# Patient Record
Sex: FEMALE | Race: WHITE | Employment: OTHER | URBAN - METROPOLITAN AREA
[De-identification: names, ages, dates, MRNs, and addresses within clinical notes are randomized per-mention and may not be internally consistent; named-entity substitution may affect disease eponyms.]

---

## 2017-02-02 PROCEDURE — 86038 ANTINUCLEAR ANTIBODIES: CPT | Performed by: OTHER

## 2017-02-08 PROCEDURE — 81001 URINALYSIS AUTO W/SCOPE: CPT | Performed by: INTERNAL MEDICINE

## 2017-02-08 PROCEDURE — 87086 URINE CULTURE/COLONY COUNT: CPT | Performed by: INTERNAL MEDICINE

## 2017-03-01 PROBLEM — Z96.1 PSEUDOPHAKIA: Status: ACTIVE | Noted: 2017-03-01

## 2017-03-01 PROBLEM — H44.23 MYOPIC DEGENERATION, BILATERAL: Status: ACTIVE | Noted: 2017-03-01

## 2017-03-01 PROBLEM — H35.341 MACULAR HOLE OF RIGHT EYE: Status: ACTIVE | Noted: 2017-03-01

## 2017-03-01 PROBLEM — H35.372 EPIRETINAL MEMBRANE (ERM) OF LEFT EYE: Status: ACTIVE | Noted: 2017-03-01

## 2017-04-18 PROCEDURE — 86200 CCP ANTIBODY: CPT | Performed by: INTERNAL MEDICINE

## 2017-04-26 PROBLEM — R29.898 WEAKNESS OF LEFT HAND: Status: ACTIVE | Noted: 2017-04-26

## 2017-04-26 PROBLEM — R29.898 WEAKNESS OF SHOULDER: Status: ACTIVE | Noted: 2017-04-26

## 2017-04-26 PROBLEM — M79.89 SWELLING OF BOTH HANDS: Status: ACTIVE | Noted: 2017-04-26

## 2017-04-26 PROBLEM — M21.942: Status: ACTIVE | Noted: 2017-04-26

## 2017-07-18 PROBLEM — M15.4 EROSIVE OSTEOARTHRITIS OF BOTH HANDS: Status: ACTIVE | Noted: 2017-07-18

## 2017-11-21 PROBLEM — M06.4 INFLAMMATORY POLYARTHRITIS (HCC): Status: ACTIVE | Noted: 2017-11-21

## 2018-01-03 PROBLEM — I10 ESSENTIAL HYPERTENSION: Status: ACTIVE | Noted: 2018-01-03

## 2019-02-11 PROBLEM — I25.10 CORONARY ARTERY CALCIFICATION SEEN ON CAT SCAN: Status: ACTIVE | Noted: 2019-02-11

## 2019-02-11 PROBLEM — I70.0 THORACIC AORTIC ATHEROSCLEROSIS (HCC): Status: ACTIVE | Noted: 2019-02-11

## 2019-02-11 PROBLEM — I65.23 BILATERAL CAROTID ARTERY STENOSIS: Status: ACTIVE | Noted: 2019-02-11

## 2019-02-11 PROBLEM — G63 NEUROPATHY ASSOCIATED WITH LYMPHOMA (HCC): Status: ACTIVE | Noted: 2019-02-11

## 2019-02-11 PROBLEM — I77.810 AORTIC ECTASIA, THORACIC (HCC): Status: ACTIVE | Noted: 2019-02-11

## 2019-02-11 PROBLEM — I77.9 ARTERIAL DISEASE (HCC): Status: ACTIVE | Noted: 2019-02-11

## 2019-02-11 PROBLEM — N18.30 CKD (CHRONIC KIDNEY DISEASE) STAGE 3, GFR 30-59 ML/MIN (HCC): Status: ACTIVE | Noted: 2019-02-11

## 2019-02-11 PROBLEM — C85.90 NEUROPATHY ASSOCIATED WITH LYMPHOMA (HCC): Status: ACTIVE | Noted: 2019-02-11

## 2019-02-12 PROBLEM — G82.50 SPASTIC QUADRIPARESIS (HCC): Status: ACTIVE | Noted: 2019-02-12

## 2020-11-22 PROBLEM — Z79.899 HIGH RISK MEDICATION USE: Status: ACTIVE | Noted: 2020-11-22

## 2021-02-14 ENCOUNTER — HOSPITAL ENCOUNTER (INPATIENT)
Facility: HOSPITAL | Age: 76
LOS: 13 days | Discharge: NURSING, REHAB OR LONG-TERM CARE | DRG: 840 | End: 2021-02-27
Attending: STUDENT IN AN ORGANIZED HEALTH CARE EDUCATION/TRAINING PROGRAM | Admitting: INTERNAL MEDICINE
Payer: MEDICARE

## 2021-02-14 ENCOUNTER — APPOINTMENT (OUTPATIENT)
Dept: GENERAL RADIOLOGY | Facility: HOSPITAL | Age: 76
DRG: 840 | End: 2021-02-14
Attending: STUDENT IN AN ORGANIZED HEALTH CARE EDUCATION/TRAINING PROGRAM
Payer: MEDICARE

## 2021-02-14 DIAGNOSIS — R41.0 CONFUSION: ICD-10-CM

## 2021-02-14 DIAGNOSIS — N17.9 ACUTE KIDNEY INJURY (HCC): ICD-10-CM

## 2021-02-14 DIAGNOSIS — R35.0 URINARY FREQUENCY: ICD-10-CM

## 2021-02-14 DIAGNOSIS — E83.52 HYPERCALCEMIA: Primary | ICD-10-CM

## 2021-02-14 LAB
ALBUMIN SERPL-MCNC: 2.7 G/DL (ref 3.4–5)
ALBUMIN/GLOB SERPL: 0.8 {RATIO} (ref 1–2)
ALP LIVER SERPL-CCNC: 60 U/L
ALT SERPL-CCNC: 22 U/L
ANION GAP SERPL CALC-SCNC: 7 MMOL/L (ref 0–18)
AST SERPL-CCNC: 27 U/L (ref 15–37)
BASOPHILS # BLD AUTO: 0.02 X10(3) UL (ref 0–0.2)
BASOPHILS NFR BLD AUTO: 0.3 %
BILIRUB SERPL-MCNC: 0.5 MG/DL (ref 0.1–2)
BILIRUB UR QL STRIP.AUTO: NEGATIVE
BUN BLD-MCNC: 33 MG/DL (ref 7–18)
BUN/CREAT SERPL: 16 (ref 10–20)
CALCIUM BLD-MCNC: 13.8 MG/DL (ref 8.5–10.1)
CHLORIDE SERPL-SCNC: 103 MMOL/L (ref 98–112)
CO2 SERPL-SCNC: 29 MMOL/L (ref 21–32)
COLOR UR AUTO: YELLOW
CREAT BLD-MCNC: 2.06 MG/DL
DEPRECATED RDW RBC AUTO: 54.7 FL (ref 35.1–46.3)
EOSINOPHIL # BLD AUTO: 0.07 X10(3) UL (ref 0–0.7)
EOSINOPHIL NFR BLD AUTO: 1.1 %
ERYTHROCYTE [DISTWIDTH] IN BLOOD BY AUTOMATED COUNT: 15.1 % (ref 11–15)
GLOBULIN PLAS-MCNC: 3.4 G/DL (ref 2.8–4.4)
GLUCOSE BLD-MCNC: 89 MG/DL (ref 70–99)
GLUCOSE UR STRIP.AUTO-MCNC: NEGATIVE MG/DL
HAV IGM SER QL: 1.5 MG/DL (ref 1.6–2.6)
HCT VFR BLD AUTO: 31.6 %
HGB BLD-MCNC: 10.4 G/DL
IMM GRANULOCYTES # BLD AUTO: 0.03 X10(3) UL (ref 0–1)
IMM GRANULOCYTES NFR BLD: 0.5 %
IONIZED CALCIUM: 1.94 MMOL/L (ref 1.12–1.32)
KETONES UR STRIP.AUTO-MCNC: NEGATIVE MG/DL
LEUKOCYTE ESTERASE UR QL STRIP.AUTO: NEGATIVE
LYMPHOCYTES # BLD AUTO: 1.14 X10(3) UL (ref 1–4)
LYMPHOCYTES NFR BLD AUTO: 18.5 %
M PROTEIN MFR SERPL ELPH: 6.1 G/DL (ref 6.4–8.2)
MCH RBC QN AUTO: 32.6 PG (ref 26–34)
MCHC RBC AUTO-ENTMCNC: 32.9 G/DL (ref 31–37)
MCV RBC AUTO: 99.1 FL
MONOCYTES # BLD AUTO: 0.75 X10(3) UL (ref 0.1–1)
MONOCYTES NFR BLD AUTO: 12.2 %
NEUTROPHILS # BLD AUTO: 4.15 X10 (3) UL (ref 1.5–7.7)
NEUTROPHILS # BLD AUTO: 4.15 X10(3) UL (ref 1.5–7.7)
NEUTROPHILS NFR BLD AUTO: 67.4 %
NITRITE UR QL STRIP.AUTO: NEGATIVE
OSMOLALITY SERPL CALC.SUM OF ELEC: 295 MOSM/KG (ref 275–295)
PH UR STRIP.AUTO: 5 [PH] (ref 4.5–8)
PHOSPHATE SERPL-MCNC: 3.3 MG/DL (ref 2.5–4.9)
PLATELET # BLD AUTO: 94 10(3)UL (ref 150–450)
POTASSIUM SERPL-SCNC: 3.7 MMOL/L (ref 3.5–5.1)
PROT UR STRIP.AUTO-MCNC: NEGATIVE MG/DL
PTH-INTACT SERPL-MCNC: 16.5 PG/ML (ref 18.5–88)
RBC # BLD AUTO: 3.19 X10(6)UL
RBC UR QL AUTO: NEGATIVE
SARS-COV-2 RNA RESP QL NAA+PROBE: NOT DETECTED
SODIUM SERPL-SCNC: 139 MMOL/L (ref 136–145)
SP GR UR STRIP.AUTO: 1.02 (ref 1–1.03)
TROPONIN I SERPL-MCNC: <0.045 NG/ML (ref ?–0.04)
TSI SER-ACNC: 0.43 MIU/ML (ref 0.36–3.74)
UROBILINOGEN UR STRIP.AUTO-MCNC: <2 MG/DL
VIT D+METAB SERPL-MCNC: 57.5 NG/ML (ref 30–100)
WBC # BLD AUTO: 6.2 X10(3) UL (ref 4–11)

## 2021-02-14 PROCEDURE — 86334 IMMUNOFIX E-PHORESIS SERUM: CPT | Performed by: INTERNAL MEDICINE

## 2021-02-14 PROCEDURE — 96361 HYDRATE IV INFUSION ADD-ON: CPT

## 2021-02-14 PROCEDURE — 83883 ASSAY NEPHELOMETRY NOT SPEC: CPT | Performed by: INTERNAL MEDICINE

## 2021-02-14 PROCEDURE — 93005 ELECTROCARDIOGRAM TRACING: CPT

## 2021-02-14 PROCEDURE — 81003 URINALYSIS AUTO W/O SCOPE: CPT | Performed by: STUDENT IN AN ORGANIZED HEALTH CARE EDUCATION/TRAINING PROGRAM

## 2021-02-14 PROCEDURE — 84100 ASSAY OF PHOSPHORUS: CPT | Performed by: STUDENT IN AN ORGANIZED HEALTH CARE EDUCATION/TRAINING PROGRAM

## 2021-02-14 PROCEDURE — 84443 ASSAY THYROID STIM HORMONE: CPT | Performed by: INTERNAL MEDICINE

## 2021-02-14 PROCEDURE — 93010 ELECTROCARDIOGRAM REPORT: CPT

## 2021-02-14 PROCEDURE — 84165 PROTEIN E-PHORESIS SERUM: CPT | Performed by: INTERNAL MEDICINE

## 2021-02-14 PROCEDURE — 96360 HYDRATION IV INFUSION INIT: CPT

## 2021-02-14 PROCEDURE — 99285 EMERGENCY DEPT VISIT HI MDM: CPT

## 2021-02-14 PROCEDURE — 80053 COMPREHEN METABOLIC PANEL: CPT | Performed by: STUDENT IN AN ORGANIZED HEALTH CARE EDUCATION/TRAINING PROGRAM

## 2021-02-14 PROCEDURE — 85025 COMPLETE CBC W/AUTO DIFF WBC: CPT | Performed by: STUDENT IN AN ORGANIZED HEALTH CARE EDUCATION/TRAINING PROGRAM

## 2021-02-14 PROCEDURE — 82330 ASSAY OF CALCIUM: CPT | Performed by: STUDENT IN AN ORGANIZED HEALTH CARE EDUCATION/TRAINING PROGRAM

## 2021-02-14 PROCEDURE — 82306 VITAMIN D 25 HYDROXY: CPT | Performed by: INTERNAL MEDICINE

## 2021-02-14 PROCEDURE — 83970 ASSAY OF PARATHORMONE: CPT | Performed by: INTERNAL MEDICINE

## 2021-02-14 PROCEDURE — 83735 ASSAY OF MAGNESIUM: CPT | Performed by: STUDENT IN AN ORGANIZED HEALTH CARE EDUCATION/TRAINING PROGRAM

## 2021-02-14 PROCEDURE — 82652 VIT D 1 25-DIHYDROXY: CPT | Performed by: INTERNAL MEDICINE

## 2021-02-14 PROCEDURE — 84484 ASSAY OF TROPONIN QUANT: CPT | Performed by: STUDENT IN AN ORGANIZED HEALTH CARE EDUCATION/TRAINING PROGRAM

## 2021-02-14 PROCEDURE — 71045 X-RAY EXAM CHEST 1 VIEW: CPT | Performed by: STUDENT IN AN ORGANIZED HEALTH CARE EDUCATION/TRAINING PROGRAM

## 2021-02-14 RX ORDER — ASPIRIN 81 MG/1
81 TABLET ORAL NIGHTLY
COMMUNITY

## 2021-02-14 RX ORDER — ALLOPURINOL 100 MG/1
200 TABLET ORAL DAILY
Status: DISCONTINUED | OUTPATIENT
Start: 2021-02-15 | End: 2021-02-27

## 2021-02-14 RX ORDER — MELATONIN
250 DAILY
Status: DISCONTINUED | OUTPATIENT
Start: 2021-02-15 | End: 2021-02-27

## 2021-02-14 RX ORDER — ACETAMINOPHEN 325 MG/1
650 TABLET ORAL EVERY 6 HOURS PRN
Status: DISCONTINUED | OUTPATIENT
Start: 2021-02-14 | End: 2021-02-27

## 2021-02-14 RX ORDER — GABAPENTIN 400 MG/1
1200 CAPSULE ORAL EVERY 8 HOURS
Status: ON HOLD | COMMUNITY
End: 2021-02-25

## 2021-02-14 RX ORDER — ONDANSETRON 2 MG/ML
4 INJECTION INTRAMUSCULAR; INTRAVENOUS EVERY 6 HOURS PRN
Status: DISCONTINUED | OUTPATIENT
Start: 2021-02-14 | End: 2021-02-27

## 2021-02-14 RX ORDER — SODIUM CHLORIDE 9 MG/ML
INJECTION, SOLUTION INTRAVENOUS CONTINUOUS
Status: ACTIVE | OUTPATIENT
Start: 2021-02-14 | End: 2021-02-14

## 2021-02-14 RX ORDER — ASPIRIN 81 MG/1
81 TABLET ORAL NIGHTLY
Status: DISCONTINUED | OUTPATIENT
Start: 2021-02-14 | End: 2021-02-27

## 2021-02-14 RX ORDER — HYDROXYCHLOROQUINE SULFATE 200 MG/1
200 TABLET, FILM COATED ORAL 2 TIMES DAILY
Status: DISCONTINUED | OUTPATIENT
Start: 2021-02-14 | End: 2021-02-27

## 2021-02-14 RX ORDER — CALCITONIN SALMON 200 [USP'U]/ML
4 INJECTION, SOLUTION INTRAMUSCULAR; SUBCUTANEOUS 2 TIMES DAILY
Status: COMPLETED | OUTPATIENT
Start: 2021-02-14 | End: 2021-02-16

## 2021-02-14 RX ORDER — HYDROXYCHLOROQUINE SULFATE 200 MG/1
200 TABLET, FILM COATED ORAL 2 TIMES DAILY
COMMUNITY

## 2021-02-14 RX ORDER — SODIUM CHLORIDE 9 MG/ML
200 INJECTION, SOLUTION INTRAVENOUS CONTINUOUS
Status: DISCONTINUED | OUTPATIENT
Start: 2021-02-14 | End: 2021-02-16

## 2021-02-14 RX ORDER — METOPROLOL SUCCINATE 50 MG/1
50 TABLET, EXTENDED RELEASE ORAL
Status: DISCONTINUED | OUTPATIENT
Start: 2021-02-15 | End: 2021-02-27

## 2021-02-14 RX ORDER — HEPARIN SODIUM 5000 [USP'U]/ML
5000 INJECTION, SOLUTION INTRAVENOUS; SUBCUTANEOUS EVERY 8 HOURS SCHEDULED
Status: DISCONTINUED | OUTPATIENT
Start: 2021-02-14 | End: 2021-02-27

## 2021-02-14 RX ORDER — MELATONIN
1000 DAILY
Status: DISCONTINUED | OUTPATIENT
Start: 2021-02-15 | End: 2021-02-27

## 2021-02-14 RX ORDER — METOCLOPRAMIDE HYDROCHLORIDE 5 MG/ML
5 INJECTION INTRAMUSCULAR; INTRAVENOUS EVERY 8 HOURS PRN
Status: DISCONTINUED | OUTPATIENT
Start: 2021-02-14 | End: 2021-02-27

## 2021-02-14 RX ORDER — SODIUM CHLORIDE 9 MG/ML
INJECTION, SOLUTION INTRAVENOUS CONTINUOUS
Status: DISCONTINUED | OUTPATIENT
Start: 2021-02-14 | End: 2021-02-14

## 2021-02-14 RX ORDER — MELATONIN
100 DAILY
Status: DISCONTINUED | OUTPATIENT
Start: 2021-02-15 | End: 2021-02-27

## 2021-02-14 RX ORDER — GABAPENTIN 300 MG/1
300 CAPSULE ORAL EVERY 8 HOURS
Status: DISCONTINUED | OUTPATIENT
Start: 2021-02-14 | End: 2021-02-16

## 2021-02-14 RX ORDER — MELATONIN
800 DAILY
Status: DISCONTINUED | OUTPATIENT
Start: 2021-02-15 | End: 2021-02-27

## 2021-02-14 RX ORDER — MELATONIN
325 DAILY
Status: DISCONTINUED | OUTPATIENT
Start: 2021-02-15 | End: 2021-02-27

## 2021-02-14 NOTE — ED INITIAL ASSESSMENT (HPI)
Pt here today for increased fatigue over the past few weeks with decreased PO intake.      Pt's son also reports possible UTI due to decreased coherency

## 2021-02-15 LAB
ALBUMIN SERPL-MCNC: 2.2 G/DL (ref 3.4–5)
ALBUMIN SERPL-MCNC: 2.4 G/DL (ref 3.4–5)
ANION GAP SERPL CALC-SCNC: 4 MMOL/L (ref 0–18)
ANION GAP SERPL CALC-SCNC: 6 MMOL/L (ref 0–18)
ATRIAL RATE: 74 BPM
ATRIAL RATE: 77 BPM
BASOPHILS # BLD AUTO: 0.01 X10(3) UL (ref 0–0.2)
BASOPHILS NFR BLD AUTO: 0.2 %
BUN BLD-MCNC: 26 MG/DL (ref 7–18)
BUN BLD-MCNC: 26 MG/DL (ref 7–18)
BUN/CREAT SERPL: 17.6 (ref 10–20)
BUN/CREAT SERPL: 17.8 (ref 10–20)
CALCIUM BLD-MCNC: 11.3 MG/DL (ref 8.5–10.1)
CALCIUM BLD-MCNC: 11.4 MG/DL (ref 8.5–10.1)
CHLORIDE SERPL-SCNC: 109 MMOL/L (ref 98–112)
CHLORIDE SERPL-SCNC: 109 MMOL/L (ref 98–112)
CO2 SERPL-SCNC: 24 MMOL/L (ref 21–32)
CO2 SERPL-SCNC: 27 MMOL/L (ref 21–32)
CREAT BLD-MCNC: 1.46 MG/DL
CREAT BLD-MCNC: 1.48 MG/DL
DEPRECATED RDW RBC AUTO: 55.1 FL (ref 35.1–46.3)
EOSINOPHIL # BLD AUTO: 0.08 X10(3) UL (ref 0–0.7)
EOSINOPHIL NFR BLD AUTO: 1.7 %
ERYTHROCYTE [DISTWIDTH] IN BLOOD BY AUTOMATED COUNT: 15.2 % (ref 11–15)
GLUCOSE BLD-MCNC: 106 MG/DL (ref 70–99)
GLUCOSE BLD-MCNC: 115 MG/DL (ref 70–99)
HAV IGM SER QL: 1.3 MG/DL (ref 1.6–2.6)
HAV IGM SER QL: 1.3 MG/DL (ref 1.6–2.6)
HCT VFR BLD AUTO: 27.3 %
HGB BLD-MCNC: 8.6 G/DL
IMM GRANULOCYTES # BLD AUTO: 0.03 X10(3) UL (ref 0–1)
IMM GRANULOCYTES NFR BLD: 0.6 %
IONIZED CALCIUM: 1.72 MMOL/L (ref 1.12–1.32)
LDH SERPL L TO P-CCNC: 234 U/L
LYMPHOCYTES # BLD AUTO: 0.87 X10(3) UL (ref 1–4)
LYMPHOCYTES NFR BLD AUTO: 18.8 %
MCH RBC QN AUTO: 31.6 PG (ref 26–34)
MCHC RBC AUTO-ENTMCNC: 31.5 G/DL (ref 31–37)
MCV RBC AUTO: 100.4 FL
MONOCYTES # BLD AUTO: 0.44 X10(3) UL (ref 0.1–1)
MONOCYTES NFR BLD AUTO: 9.5 %
NEUTROPHILS # BLD AUTO: 3.19 X10 (3) UL (ref 1.5–7.7)
NEUTROPHILS # BLD AUTO: 3.19 X10(3) UL (ref 1.5–7.7)
NEUTROPHILS NFR BLD AUTO: 69.2 %
OSMOLALITY SERPL CALC.SUM OF ELEC: 294 MOSM/KG (ref 275–295)
OSMOLALITY SERPL CALC.SUM OF ELEC: 295 MOSM/KG (ref 275–295)
P AXIS: 63 DEGREES
P AXIS: 70 DEGREES
P-R INTERVAL: 202 MS
P-R INTERVAL: 210 MS
PHOSPHATE SERPL-MCNC: 2.7 MG/DL (ref 2.5–4.9)
PHOSPHATE SERPL-MCNC: 3 MG/DL (ref 2.5–4.9)
PLATELET # BLD AUTO: 79 10(3)UL (ref 150–450)
POTASSIUM SERPL-SCNC: 3.6 MMOL/L (ref 3.5–5.1)
POTASSIUM SERPL-SCNC: 4 MMOL/L (ref 3.5–5.1)
POTASSIUM SERPL-SCNC: 4 MMOL/L (ref 3.5–5.1)
Q-T INTERVAL: 356 MS
Q-T INTERVAL: 404 MS
QRS DURATION: 90 MS
QRS DURATION: 94 MS
QTC CALCULATION (BEZET): 448 MS
QTC CALCULATION (BEZET): 461 MS
R AXIS: -29 DEGREES
R AXIS: -29 DEGREES
RBC # BLD AUTO: 2.72 X10(6)UL
SODIUM SERPL-SCNC: 139 MMOL/L (ref 136–145)
SODIUM SERPL-SCNC: 140 MMOL/L (ref 136–145)
T AXIS: 26 DEGREES
T AXIS: 33 DEGREES
URATE SERPL-MCNC: 7.3 MG/DL
VENTRICULAR RATE: 101 BPM
VENTRICULAR RATE: 74 BPM
WBC # BLD AUTO: 4.6 X10(3) UL (ref 4–11)

## 2021-02-15 PROCEDURE — 80069 RENAL FUNCTION PANEL: CPT | Performed by: INTERNAL MEDICINE

## 2021-02-15 PROCEDURE — 83735 ASSAY OF MAGNESIUM: CPT | Performed by: INTERNAL MEDICINE

## 2021-02-15 PROCEDURE — 84132 ASSAY OF SERUM POTASSIUM: CPT | Performed by: HOSPITALIST

## 2021-02-15 PROCEDURE — 82330 ASSAY OF CALCIUM: CPT | Performed by: INTERNAL MEDICINE

## 2021-02-15 PROCEDURE — 83615 LACTATE (LD) (LDH) ENZYME: CPT | Performed by: INTERNAL MEDICINE

## 2021-02-15 PROCEDURE — 82542 COL CHROMOTOGRAPHY QUAL/QUAN: CPT | Performed by: INTERNAL MEDICINE

## 2021-02-15 PROCEDURE — 85025 COMPLETE CBC W/AUTO DIFF WBC: CPT | Performed by: INTERNAL MEDICINE

## 2021-02-15 PROCEDURE — 84550 ASSAY OF BLOOD/URIC ACID: CPT | Performed by: INTERNAL MEDICINE

## 2021-02-15 RX ORDER — MAGNESIUM OXIDE 400 MG (241.3 MG MAGNESIUM) TABLET
800 TABLET ONCE
Status: COMPLETED | OUTPATIENT
Start: 2021-02-15 | End: 2021-02-15

## 2021-02-15 RX ORDER — MAGNESIUM SULFATE 1 G/100ML
1 INJECTION INTRAVENOUS ONCE
Status: COMPLETED | OUTPATIENT
Start: 2021-02-15 | End: 2021-02-15

## 2021-02-15 RX ORDER — POTASSIUM CHLORIDE 20 MEQ/1
40 TABLET, EXTENDED RELEASE ORAL ONCE
Status: COMPLETED | OUTPATIENT
Start: 2021-02-15 | End: 2021-02-15

## 2021-02-15 NOTE — PLAN OF CARE
Pt AOx3, feeling more like herself today. IVF increased to 0.9/200. Nephrology on case, following renal function throughout day. Electrolytes replaced per hospitalist order. Up to chair. PT consult. No c/o pain.

## 2021-02-15 NOTE — PROGRESS NOTES
NURSING ADMISSION NOTE      Patient admitted via Cart  Oriented to room. Safety precautions initiated. Bed in low position. Call light in reach. Pt admitted from ED. VSS, afebrile. Denies pain or SOB.  Admission navigator completed with patient an

## 2021-02-15 NOTE — CONSULTS
[facility]    Report of Consultation    Juliane Tena Patient Status:  Inpatient    3/17/1945 MRN [id number]   [facility] 4NW-A Attending Grey Carey MD   Hosp Day # 1 PCP Louise Paredes MD     Date of consult: 2/15/2021 SURGICAL HISTORY  2-15-10    cystocele repair   • OTHER SURGICAL HISTORY      macular hole right   • RIGHT PHACOEMULSIFICATION OF CATARACT WITH INTRAOCULAR LENS IMPLANT [id number] Left 6/17/2009    Performed by Rhona Reilly MD at [address] [place] [address]   • ondansetron HCl (ZOFRAN) injection 4 mg, 4 mg, Intravenous, Q6H PRN  •  Metoclopramide HCl (REGLAN) injection 5 mg, 5 mg, Intravenous, Q8H PRN  •  Calcitonin (Allison) (MIACALCIN) injection 240 Units, 4 Units/kg, Subcutaneous, BID  No current outpatient med 02/15/2021    MCH 31.6 02/15/2021    MCHC 31.5 02/15/2021    RDW 15.2 (H) 02/15/2021    NEPRELIM 3.19 02/15/2021    NEUTABS 6.22 11/06/2013    LYMPHABS 4.83 (H) 11/06/2013    EOSABS 0.00 11/06/2013    BASABS 0.00 11/06/2013    NEUT 48 11/06/2013    LYMPH 3 CKD3b  -- baseline Cr 1.2-1.4mg/dL   -- Cr 2.06mg/dL on admit   -- likely prerenal from poor po intake + vasoconstriction from hypercalcemia   -- check urine lytes  -- continue IVFs  -- if not improving further check renal US  -- avoid nsaids and renally d

## 2021-02-15 NOTE — CONSULTS
[facility]  Report of Consultation    Carl Santo Patient Status:  Inpatient    3/17/1945 MRN [id number]   [facility] 4NW-A Attending Rocky Colon MD   Hosp Day # 1 PCP Sonny Coulter MD     REASON FOR CONSULTATION:     NHL his History:   Procedure Laterality Date   • BIOPSY OF NECK LYMPH NODE N/A 6/14/2013    Performed by Pablo Newsome MD at [address] [place] [address]   • BIOPSY OF NECK LYMPH NODE Left 5/29/2013    Performed by Pablo Newsome MD at [facility] gabapentin (NEURONTIN) cap 300 mg, 300 mg, Oral, Q8H  •  Hydroxychloroquine Sulfate (PLAQUENIL) tab 200 mg, 200 mg, Oral, BID  •  Metoprolol Succinate ER (Toprol XL) 24 hr tab 50 mg, 50 mg, Oral, Daily Beta Blocker  •  vitamin B-6 (pyridoxine) tab 100 mg, Value Ref Range    Hold Lavender Auto Resulted    RAINBOW DRAW LIGHT GREEN    Collection Time: 02/14/21  3:23 PM   Result Value Ref Range    Hold Lt Green Auto Resulted    RAINBOW DRAW GOLD    Collection Time: 02/14/21  3:23 PM   Result Value Ref Range - 1.00 x10(3) uL    Neutrophil % 67.4 %    Lymphocyte % 18.5 %    Monocyte % 12.2 %    Eosinophil % 1.1 %    Basophil % 0.3 %    Immature Granulocyte % 0.5 %   PHOSPHORUS    Collection Time: 02/14/21  3:23 PM   Result Value Ref Range    Phosphorus 3.3 2.5 SARS-CoV-2 by PCR Not Detected Not Detected   CALCIUM, IONIZED    Collection Time: 02/14/21  5:14 PM   Result Value Ref Range    Ionized Calcium 1.94 (H) 1.12 - 1.32 mmol/L   PTH, INTACT    Collection Time: 02/14/21  8:49 PM   Result Value Ref Range    Pth %   SCAN SLIDE    Collection Time: 02/15/21  7:10 AM   Result Value Ref Range    Slide Review       Slide reviewed, normal WBC, RBC, and platelet morphology.    LDH    Collection Time: 02/15/21  7:10 AM   Result Value Ref Range     84 - 246 U/L   URI Myopic degeneration, bilateral     Macular hole of right eye     Epiretinal membrane (ERM) of left eye     Pseudophakia     Erosive osteoarthritis of both hands     Inflammatory polyarthritis ([address]     Essential hypertension     Thoracic aortic atheroscler

## 2021-02-15 NOTE — H&P
[facility] Medicine H&P     Patient presents with:  Fatigue       PCP: Roselyn Koroma MD    History of Present Illness: Patient is a 76year old female with PMH sig for non-Hodgkins lymphoma, CKD 3, inflammatory polyarthritis, and gout, presented to ER last 2-15-10    cystocele repair   • OTHER SURGICAL HISTORY      macular hole right   • RIGHT PHACOEMULSIFICATION OF CATARACT WITH INTRAOCULAR LENS IMPLANT [id number] Left 6/17/2009    Performed by Kathryn Richmond MD at [facility] [place] [address] at 0900    •  Ferrous Sulfate (IRON) 28 MG Oral Tab, Take 65 mg by mouth daily. , Disp: , Rfl: , 2/14/2021 at Unknown time    •  Cyanocobalamin (VITAMIN B-12) 1000 MCG Oral Tab, Take 1,000 mcg by mouth daily. , Disp: , Rfl: , 2/14/2021 at Unknown time    • consistent with small pleural effusions, left greater than right. Heart and pulmonary vessels appear stable, normal caliber allowing for portable technique. Mediastinal contours are smooth.   There is partial visualization of a new left IJ approach chest

## 2021-02-15 NOTE — CM/SW NOTE
02/15/21 1600   CM/SW Referral Data   Referral Source Social Work (self-referral)   Reason for Referral Discharge planning   Informant Patient   Patient Info   Patient's [address]   Number of Levels in Home 2   Number of Stair in Home 8   P

## 2021-02-15 NOTE — ED PROVIDER NOTES
Patient Seen in: [facility] Emergency Department      History   Patient presents with:  Fatigue    Stated Complaint: Fatigue, possible UTI    HPI/Subjective:   HPI    Patient is a 77-year-old female who presents emergency department with fatigue, de PHACOEMULSIFICATION OF CATARACT WITH INTRAOCULAR LENS IMPLANT [id number] Left 6/17/2009    Performed by Justice Rubinstein, MD at [address] [place] [address]   • ULTRASOUND GUIDED FINE NEEDLE ASPIRATION OF  LYMPH NODE Left 5/29/2013    Performed by Stephanie Murillo, Reviewed   COMP METABOLIC PANEL (14) - Abnormal; Notable for the following components:       Result Value    BUN 33 (*)     Creatinine 2.06 (*)     Calcium, Total 13.8 (*)     GFR, Non- 23 (*)     GFR, -American 27 (*)     Total Prot noted on EKG Report. Rate: 74  Rhythm: Sinus Rhythm  Reading: Sinus rhythm with first-degree AV block and frequent PVCs, QRS duration 94 ms,  ms, nonspecific ST wave abnormalities noted.   Nonspecific change from prior EKG from September 2015 is pre

## 2021-02-16 ENCOUNTER — APPOINTMENT (OUTPATIENT)
Dept: GENERAL RADIOLOGY | Facility: HOSPITAL | Age: 76
DRG: 840 | End: 2021-02-16
Attending: HOSPITALIST
Payer: MEDICARE

## 2021-02-16 LAB
ALBUMIN SERPL-MCNC: 2 G/DL (ref 3.4–5)
ANION GAP SERPL CALC-SCNC: 5 MMOL/L (ref 0–18)
BASOPHILS # BLD AUTO: 0.03 X10(3) UL (ref 0–0.2)
BASOPHILS NFR BLD AUTO: 0.7 %
BUN BLD-MCNC: 21 MG/DL (ref 7–18)
BUN/CREAT SERPL: 15.7 (ref 10–20)
CALCIUM BLD-MCNC: 10.2 MG/DL (ref 8.5–10.1)
CHLORIDE SERPL-SCNC: 111 MMOL/L (ref 98–112)
CO2 SERPL-SCNC: 25 MMOL/L (ref 21–32)
CREAT BLD-MCNC: 1.34 MG/DL
DEPRECATED RDW RBC AUTO: 55.4 FL (ref 35.1–46.3)
EOSINOPHIL # BLD AUTO: 0.08 X10(3) UL (ref 0–0.7)
EOSINOPHIL NFR BLD AUTO: 1.8 %
ERYTHROCYTE [DISTWIDTH] IN BLOOD BY AUTOMATED COUNT: 15.4 % (ref 11–15)
GLUCOSE BLD-MCNC: 88 MG/DL (ref 70–99)
HAV IGM SER QL: 1.6 MG/DL (ref 1.6–2.6)
HCT VFR BLD AUTO: 26.7 %
HGB BLD-MCNC: 8.7 G/DL
IMM GRANULOCYTES # BLD AUTO: 0.03 X10(3) UL (ref 0–1)
IMM GRANULOCYTES NFR BLD: 0.7 %
LYMPHOCYTES # BLD AUTO: 0.72 X10(3) UL (ref 1–4)
LYMPHOCYTES NFR BLD AUTO: 15.9 %
MCH RBC QN AUTO: 32.3 PG (ref 26–34)
MCHC RBC AUTO-ENTMCNC: 32.6 G/DL (ref 31–37)
MCV RBC AUTO: 99.3 FL
MONOCYTES # BLD AUTO: 0.38 X10(3) UL (ref 0.1–1)
MONOCYTES NFR BLD AUTO: 8.4 %
NEUTROPHILS # BLD AUTO: 3.29 X10 (3) UL (ref 1.5–7.7)
NEUTROPHILS # BLD AUTO: 3.29 X10(3) UL (ref 1.5–7.7)
NEUTROPHILS NFR BLD AUTO: 72.5 %
OSMOLALITY SERPL CALC.SUM OF ELEC: 294 MOSM/KG (ref 275–295)
PHOSPHATE SERPL-MCNC: 2.5 MG/DL (ref 2.5–4.9)
PLATELET # BLD AUTO: 80 10(3)UL (ref 150–450)
POTASSIUM SERPL-SCNC: 3.9 MMOL/L (ref 3.5–5.1)
RBC # BLD AUTO: 2.69 X10(6)UL
SODIUM SERPL-SCNC: 141 MMOL/L (ref 136–145)
WBC # BLD AUTO: 4.5 X10(3) UL (ref 4–11)

## 2021-02-16 PROCEDURE — 83735 ASSAY OF MAGNESIUM: CPT | Performed by: INTERNAL MEDICINE

## 2021-02-16 PROCEDURE — 97162 PT EVAL MOD COMPLEX 30 MIN: CPT

## 2021-02-16 PROCEDURE — 97530 THERAPEUTIC ACTIVITIES: CPT

## 2021-02-16 PROCEDURE — 85025 COMPLETE CBC W/AUTO DIFF WBC: CPT | Performed by: HOSPITALIST

## 2021-02-16 PROCEDURE — 80069 RENAL FUNCTION PANEL: CPT | Performed by: INTERNAL MEDICINE

## 2021-02-16 PROCEDURE — 71045 X-RAY EXAM CHEST 1 VIEW: CPT | Performed by: HOSPITALIST

## 2021-02-16 RX ORDER — SODIUM CHLORIDE 9 MG/ML
INJECTION, SOLUTION INTRAVENOUS CONTINUOUS
Status: DISCONTINUED | OUTPATIENT
Start: 2021-02-16 | End: 2021-02-18

## 2021-02-16 NOTE — PHYSICAL THERAPY NOTE
PHYSICAL THERAPY EVALUATION - INPATIENT     Room Number: 949/033-F  Evaluation Date: 2/16/2021  Type of Evaluation: Initial  Physician Order: PT Eval and Treat    Presenting Problem: weakness, AMS   Reason for Therapy: Mobility Dysfunction and Discha IMPLANT [id number] Left 6/17/2009    Performed by Raymon Juarez MD at [facility]   • ULTRASOUND GUIDED FINE NEEDLE ASPIRATION OF  LYMPH NODE Left 5/29/2013    Performed by Maria Guadalupe Hugo MD at [address] [place] [address] difficulty does the patient currently have. ..  -   Turning over in bed (including adjusting bedclothes, sheets and blankets)?: A Little   -   Sitting down on and standing up from a chair with arms (e.g., wheelchair, bedside commode, etc.): A Lot   -   Movi training  Posture  Strengthening  Transfer training    Patient End of Session: In bed;Needs met;Call light within reach;RN aware of session/findings; All patient questions and concerns addressed; Alarm set    ASSESSMENT   Patient is a 76year old female admi with assist device: walker - rolling at assistance level: minimum assistance     Goal #4    Goal #5    Goal #6    Goal Comments: Goals established on 2/16/2021    PPE donned by therapist: gloves, goggles, surgical mask

## 2021-02-16 NOTE — PROGRESS NOTES
[facility]  Progress Note    Manisha Pro Patient Status:  Inpatient    3/17/1945 MRN [id number]   [facility] 4NW-A Attending Hugh Alonso MD   Hosp Day # 2 PCP Anny Cross MD     Subjective:    Feeling better     Malou Ashley mg/dL    Calculated Osmolality 294 275 - 295 mOsm/kg    GFR, Non- 39 (L) >=60    GFR, -American 45 (L) >=60    Albumin 2.0 (L) 3.4 - 5.0 g/dL    Phosphorus 2.5 2.5 - 4.9 mg/dL   MAGNESIUM    Collection Time: 02/16/21  6:23 AM   Resul found.    Medications reviewed.     • allopurinol  200 mg Oral Daily   • aspirin  81 mg Oral Nightly   • cholecalciferol  1,000 Units Oral Daily   • Vitamin B-12  1,000 mcg Oral Daily   • ferrous sulfate  325 mg Oral Daily   • folic acid  027 mcg Oral Daily

## 2021-02-16 NOTE — PROGRESS NOTES
[facility] Hospitalist Progress Note                                                                   [address] [facility]  3/17/1945    SUBJECTIVE:  Pt seen and examined.   States she feels a little Infusions:   • sodium chloride 200 mL/hr (02/16/21 1319)     PRN: acetaminophen, ondansetron HCl, Metoclopramide HCl    Assessment/Plan:  Principal Problem:    Hypercalcemia  Active Problems:    Confusion    Urinary frequency    Acute kidney injury ([facility] [address]

## 2021-02-16 NOTE — CONSULTS
[facility] Pharmacy Note:  Renal Dose Adjustment    Jean Paul Garcia currently receiving gabapentin 300 mg orally every 8 hours. This dose was previously adjusted based on acute kidney injury on admit with CrCl < 30 ml/min.   Pharmacy consulted now per Dr Bhakti Zheng

## 2021-02-16 NOTE — PROGRESS NOTES
[facility]    Nephrology Progress Note    [facility] Attending:  Jonathan Ugarte DO     Cc: SUBHASH    SUBJECTIVE     Has some vomiting after eating some   No complaints otherwise    PHYSICAL EXAM   Vital signs: /61 (BP Location: Right ar (TYLENOL) tab 650 mg, 650 mg, Oral, Q6H PRN    •  ondansetron HCl (ZOFRAN) injection 4 mg, 4 mg, Intravenous, Q6H PRN    •  Metoclopramide HCl (REGLAN) injection 5 mg, 5 mg, Intravenous, Q8H PRN        LABS     Lab Results   Component Value Date    WBC 4.5 76year old female non-Hodgkins lymphoma sp CHOP, CKD3, inflammatory polyarthritis, and gout presented to ED with confusion. Noted to have Cr 2.06, Ca 13.8 (corrected Ca 14.84).       SUBHASH on CKD3b  -- baseline Cr 1.2-1.4mg/dL   -- Cr 2.06mg/dL on admit

## 2021-02-16 NOTE — PLAN OF CARE
Pt is A&Ox4 forgetful at times. On RA. VSS. Remains afebrile. IVF . 9 at 200 per renal. Pt is incontinent of bowel and bladder. Pt had one episode of emesis. Medication given. Recheck showed progress.        Problem: METABOLIC/FLUID AND ELECTROLYTES - ADULT

## 2021-02-17 ENCOUNTER — APPOINTMENT (OUTPATIENT)
Dept: CT IMAGING | Facility: HOSPITAL | Age: 76
DRG: 840 | End: 2021-02-17
Attending: INTERNAL MEDICINE
Payer: MEDICARE

## 2021-02-17 LAB
ALBUMIN SERPL-MCNC: 2.1 G/DL (ref 3.4–5)
ANION GAP SERPL CALC-SCNC: 8 MMOL/L (ref 0–18)
BASOPHILS # BLD AUTO: 0.02 X10(3) UL (ref 0–0.2)
BASOPHILS NFR BLD AUTO: 0.4 %
BUN BLD-MCNC: 19 MG/DL (ref 7–18)
BUN/CREAT SERPL: 17.9 (ref 10–20)
CALCIUM BLD-MCNC: 9.6 MG/DL (ref 8.5–10.1)
CHLORIDE SERPL-SCNC: 111 MMOL/L (ref 98–112)
CO2 SERPL-SCNC: 20 MMOL/L (ref 21–32)
CREAT BLD-MCNC: 1.06 MG/DL
DEPRECATED RDW RBC AUTO: 57.5 FL (ref 35.1–46.3)
EOSINOPHIL # BLD AUTO: 0.06 X10(3) UL (ref 0–0.7)
EOSINOPHIL NFR BLD AUTO: 1.2 %
ERYTHROCYTE [DISTWIDTH] IN BLOOD BY AUTOMATED COUNT: 15.4 % (ref 11–15)
GLUCOSE BLD-MCNC: 57 MG/DL (ref 70–99)
HAV IGM SER QL: 1.4 MG/DL (ref 1.6–2.6)
HCT VFR BLD AUTO: 28.7 %
HGB BLD-MCNC: 9.2 G/DL
IMM GRANULOCYTES # BLD AUTO: 0.03 X10(3) UL (ref 0–1)
IMM GRANULOCYTES NFR BLD: 0.6 %
LYMPHOCYTES # BLD AUTO: 0.88 X10(3) UL (ref 1–4)
LYMPHOCYTES NFR BLD AUTO: 17 %
MCH RBC QN AUTO: 32.4 PG (ref 26–34)
MCHC RBC AUTO-ENTMCNC: 32.1 G/DL (ref 31–37)
MCV RBC AUTO: 101.1 FL
MONOCYTES # BLD AUTO: 0.42 X10(3) UL (ref 0.1–1)
MONOCYTES NFR BLD AUTO: 8.1 %
NEUTROPHILS # BLD AUTO: 3.77 X10 (3) UL (ref 1.5–7.7)
NEUTROPHILS # BLD AUTO: 3.77 X10(3) UL (ref 1.5–7.7)
NEUTROPHILS NFR BLD AUTO: 72.7 %
OSMOLALITY SERPL CALC.SUM OF ELEC: 288 MOSM/KG (ref 275–295)
PHOSPHATE SERPL-MCNC: 2.7 MG/DL (ref 2.5–4.9)
PLATELET # BLD AUTO: 74 10(3)UL (ref 150–450)
POTASSIUM SERPL-SCNC: 4.1 MMOL/L (ref 3.5–5.1)
RBC # BLD AUTO: 2.84 X10(6)UL
SODIUM SERPL-SCNC: 139 MMOL/L (ref 136–145)
WBC # BLD AUTO: 5.2 X10(3) UL (ref 4–11)

## 2021-02-17 PROCEDURE — 97530 THERAPEUTIC ACTIVITIES: CPT

## 2021-02-17 PROCEDURE — 80069 RENAL FUNCTION PANEL: CPT | Performed by: INTERNAL MEDICINE

## 2021-02-17 PROCEDURE — 97166 OT EVAL MOD COMPLEX 45 MIN: CPT

## 2021-02-17 PROCEDURE — 85025 COMPLETE CBC W/AUTO DIFF WBC: CPT | Performed by: INTERNAL MEDICINE

## 2021-02-17 PROCEDURE — 83735 ASSAY OF MAGNESIUM: CPT | Performed by: INTERNAL MEDICINE

## 2021-02-17 PROCEDURE — 71260 CT THORAX DX C+: CPT | Performed by: INTERNAL MEDICINE

## 2021-02-17 PROCEDURE — 74177 CT ABD & PELVIS W/CONTRAST: CPT | Performed by: INTERNAL MEDICINE

## 2021-02-17 RX ORDER — IPRATROPIUM BROMIDE AND ALBUTEROL SULFATE 2.5; .5 MG/3ML; MG/3ML
3 SOLUTION RESPIRATORY (INHALATION) EVERY 4 HOURS PRN
Status: DISCONTINUED | OUTPATIENT
Start: 2021-02-17 | End: 2021-02-27

## 2021-02-17 RX ORDER — MAGNESIUM SULFATE HEPTAHYDRATE 40 MG/ML
2 INJECTION, SOLUTION INTRAVENOUS ONCE
Status: COMPLETED | OUTPATIENT
Start: 2021-02-17 | End: 2021-02-17

## 2021-02-17 NOTE — PROGRESS NOTES
[facility]    Nephrology Progress Note    Bill Kim Attending:  Lupe Cates DO     Cc: SUBHASH    SUBJECTIVE     Po intake slightly improved but not great  Some wheezing   No complaints otherwise    PHYSICAL EXAM   Vital signs: /64 ( Daily    •  Heparin Sodium (Porcine) 5000 UNIT/ML injection 5,000 Units, 5,000 Units, Subcutaneous, Q8H [facility]    •  acetaminophen (TYLENOL) tab 650 mg, 650 mg, Oral, Q6H PRN    •  ondansetron HCl (ZOFRAN) injection 4 mg, 4 mg, Intravenous, Q6H PRN    •  Meto PORTABLE  (CPT=71045)   Final Result    PROCEDURE:  XR CHEST AP PORTABLE  (CPT=71045)         TECHNIQUE:  AP chest radiograph was obtained.          COMPARISON:  [facility] , XR, XR CHEST PA + LAT CHEST (CPT=71020), 9/05/2015,     2:46 PM.         INDICATIONS: may need zometa in future, oncology recs appreciated     Hypomagnesium  -- replete and monitor     Anemia/Thrombocytopenia  -- monitor     D/w RN, Dr. Rodrigo Degroot, Dr. Case Ahn    Thank you for allowing me to participate in the care of this patient.  Please do no

## 2021-02-17 NOTE — PROGRESS NOTES
[facility] Hospitalist Progress Note                                                                   [address]  3/17/1945    SUBJECTIVE:  Pt seen and examined. Feels a little better.   C/ Metoprolol Succinate ER  50 mg Oral Daily Beta Blocker   • vitamin B-6 (pyridoxine)  100 mg Oral Daily   • Vitamin B-1  250 mg Oral Daily   • Heparin Sodium (Porcine)  5,000 Units Subcutaneous Q8H [address]     Continuous Infusions:   • sodium chloride 150 mL/hr

## 2021-02-17 NOTE — PROGRESS NOTES
[facility]  Progress Note    Shelley Raphael Patient Status:  Inpatient    3/17/1945 MRN [id number]   [facility] 4NW-A Attending Anthony Haider MD   Hosp Day # 3 PCP Willy Maria MD     Subjective:    Feeling better   Sitting i Eosinophil Absolute 0.06 0.00 - 0.70 x10(3) uL    Basophil Absolute 0.02 0.00 - 0.20 x10(3) uL    Immature Granulocyte Absolute 0.03 0.00 - 1.00 x10(3) uL    Neutrophil % 72.7 %    Lymphocyte % 17.0 %    Monocyte % 8.1 %    Eosinophil % 1.2 %    Nicolle Succinate ER  50 mg Oral Daily Beta Blocker   • vitamin B-6 (pyridoxine)  100 mg Oral Daily   • Vitamin B-1  250 mg Oral Daily   • Heparin Sodium (Porcine)  5,000 Units Subcutaneous [facility]         ASSESSMENT AND PLAN:     Tracie Mosqueda is a 76 year ol

## 2021-02-17 NOTE — CONSULTS
PHYSICAL MEDICINE AND REHABILITATION CONSULTATION       Location [address] [place] [address] 4NW-A Attending Jian Briceño, DO   Hosp Day # 3 PCP Whitney Andrew MD     Patient Identification  Aydee Rodriguez is a 76year old female.   :  3/17/1945  Admit Date Full medication list has been personally reviewed and include:    •  Piperacillin Sod-Tazobactam So (ZOSYN) 3.375 g in dextrose 5% 100 mL IVPB-ADDV, 3.375 g, Intravenous, Q8H    •  Magnesium Sulfate IVPB premix SOLN 2 g, 2 g, Intravenous, Once    •  gabape Atrial arrhythmia 3/5/2014   • Cancer [facility])     Non Hodgkins Lymphoma   • Gout    • Gout    • HYPERTENSION    • Hypertension    • Low grade B-cell lymphoma ([facility] [address] 7/29/2013   • Neuropathy     Neuropathy both feet   • Neuropathy    • Osteoarthritis    • Oste History      Marital status:        Spouse name: Not on file      Number of children: Not on file      Years of education: Not on file      Highest education level: Not on file    Tobacco Use      Smoking status: Never Smoker      Smokeless tobacco: agreeable to therapy. Pt supine-sit with MIN assist for trunk support. Pt demonstrates fair static sitting balance at EOB. Pt requires assist to don shoes. Pt sit-stand with RW and MOD assist for balance.  Once in standing patient demonstrates excessive R L 02/17/2021    HCT 28.7 02/17/2021    PLT 74.0 02/17/2021    CREATSERUM 1.06 02/17/2021    BUN 19 02/17/2021     02/17/2021    K 4.1 02/17/2021     02/17/2021    CO2 20.0 02/17/2021    GLU 57 02/17/2021    CA 9.6 02/17/2021    ALB 2.1 02/17/2021 support in the event there are any adjustment issues to decline in functional status  24 hr rehab nursing also beneficial for medication management, pressure sore prevention, bowel and bladder management, skin management.  Physiatric medical oversight to mo

## 2021-02-17 NOTE — PLAN OF CARE
Pt A/O x3-4. Vitals stable. Afebrile. Pt denies pain. Upon assessment, pt was slightly SOB at rest. Audible wheezing heard from mouth, but lung sounds only diminished. No wheezing heard upon auscultation.  IV fluids were decreased earlier in the day yesterd interventions as ordered  Outcome: Progressing     Problem: Impaired Functional Mobility  Goal: Achieve highest/safest level of mobility/gait  Description: Interventions:  - Assess patient's functional ability and stability  - Promote increasing activity/t

## 2021-02-17 NOTE — PLAN OF CARE
A&Ox3. VSS, afebrile. Forgetful but easily reorients. Daughter at the bedside this afternoon, update provided. Patient reports low appetite, also nervous about vomiting again like last night. No complaints of N/V/D.  Encouraged good nutrition, good fluid measures as available)  - Encourage oral intake as appropriate  - Instruct patient on fluid and nutrition restrictions as appropriate  Outcome: Progressing     Problem: NEUROLOGICAL - ADULT  Goal: Achieves stable or improved neurological status  Descriptio

## 2021-02-17 NOTE — OCCUPATIONAL THERAPY NOTE
OCCUPATIONAL THERAPY EVALUATION - INPATIENT     Room Number: 448/391-D  Evaluation Date: 2/17/2021  Type of Evaluation: Initial  Presenting Problem: AMS, SOB    Physician Order: IP Consult to Occupational Therapy  Reason for Therapy: ADL/IADL Dysfunction a INTRAOCULAR LENS IMPLANT [id number] Left 6/17/2009    Performed by Aixa Hayward MD at [address] [place] [address]   • ULTRASOUND GUIDED FINE NEEDLE ASPIRATION OF  LYMPH NODE Left 5/29/2013    Performed by Eros Alvarez MD at [address] [place] [address] ACTIVITIES OF DAILY LIVING ASSESSMENT  AM-PAC ‘6-Clicks’ Inpatient Daily Activity Short Form  How much help from another person does the patient currently need…  -   Putting on and taking off regular lower body clothing?: A Lot  -   Bathing (including safely to her prior level of function.     Patient Complexity  Occupational Profile/Medical History MODERATE - Expanded review of history including review of medical or therapy record   Specific performance deficits impacting engagement in ADL/IADL MODERATE

## 2021-02-18 LAB
1,25-DIHYDROXYVITAMIN D: 206.4 PG/ML
ANION GAP SERPL CALC-SCNC: 7 MMOL/L (ref 0–18)
BASOPHILS # BLD AUTO: 0.03 X10(3) UL (ref 0–0.2)
BASOPHILS NFR BLD AUTO: 0.5 %
BUN BLD-MCNC: 17 MG/DL (ref 7–18)
BUN/CREAT SERPL: 12.9 (ref 10–20)
CALCIUM BLD-MCNC: 9.7 MG/DL (ref 8.5–10.1)
CHLORIDE SERPL-SCNC: 111 MMOL/L (ref 98–112)
CO2 SERPL-SCNC: 24 MMOL/L (ref 21–32)
CREAT BLD-MCNC: 1.32 MG/DL
DEPRECATED RDW RBC AUTO: 54.4 FL (ref 35.1–46.3)
EOSINOPHIL # BLD AUTO: 0.13 X10(3) UL (ref 0–0.7)
EOSINOPHIL NFR BLD AUTO: 2.4 %
ERYTHROCYTE [DISTWIDTH] IN BLOOD BY AUTOMATED COUNT: 15.2 % (ref 11–15)
GLUCOSE BLD-MCNC: 82 MG/DL (ref 70–99)
HAV IGM SER QL: 1.8 MG/DL (ref 1.6–2.6)
HCT VFR BLD AUTO: 28.1 %
HGB BLD-MCNC: 9.2 G/DL
IMM GRANULOCYTES # BLD AUTO: 0.04 X10(3) UL (ref 0–1)
IMM GRANULOCYTES NFR BLD: 0.7 %
LYMPHOCYTES # BLD AUTO: 0.9 X10(3) UL (ref 1–4)
LYMPHOCYTES NFR BLD AUTO: 16.3 %
MCH RBC QN AUTO: 31.9 PG (ref 26–34)
MCHC RBC AUTO-ENTMCNC: 32.7 G/DL (ref 31–37)
MCV RBC AUTO: 97.6 FL
MONOCYTES # BLD AUTO: 0.54 X10(3) UL (ref 0.1–1)
MONOCYTES NFR BLD AUTO: 9.8 %
NEUTROPHILS # BLD AUTO: 3.87 X10 (3) UL (ref 1.5–7.7)
NEUTROPHILS # BLD AUTO: 3.87 X10(3) UL (ref 1.5–7.7)
NEUTROPHILS NFR BLD AUTO: 70.3 %
OSMOLALITY SERPL CALC.SUM OF ELEC: 295 MOSM/KG (ref 275–295)
PLATELET # BLD AUTO: 92 10(3)UL (ref 150–450)
POTASSIUM SERPL-SCNC: 3.2 MMOL/L (ref 3.5–5.1)
PROCALCITONIN SERPL-MCNC: 0.79 NG/ML (ref ?–0.16)
RBC # BLD AUTO: 2.88 X10(6)UL
SODIUM SERPL-SCNC: 142 MMOL/L (ref 136–145)
WBC # BLD AUTO: 5.5 X10(3) UL (ref 4–11)

## 2021-02-18 PROCEDURE — 97116 GAIT TRAINING THERAPY: CPT

## 2021-02-18 PROCEDURE — 80048 BASIC METABOLIC PNL TOTAL CA: CPT | Performed by: INTERNAL MEDICINE

## 2021-02-18 PROCEDURE — 84145 PROCALCITONIN (PCT): CPT | Performed by: INTERNAL MEDICINE

## 2021-02-18 PROCEDURE — 97110 THERAPEUTIC EXERCISES: CPT

## 2021-02-18 PROCEDURE — 85025 COMPLETE CBC W/AUTO DIFF WBC: CPT | Performed by: INTERNAL MEDICINE

## 2021-02-18 PROCEDURE — 83735 ASSAY OF MAGNESIUM: CPT | Performed by: INTERNAL MEDICINE

## 2021-02-18 RX ORDER — POTASSIUM CHLORIDE 1.5 G/1.77G
40 POWDER, FOR SOLUTION ORAL EVERY 4 HOURS
Status: DISCONTINUED | OUTPATIENT
Start: 2021-02-18 | End: 2021-02-18

## 2021-02-18 RX ORDER — POTASSIUM CHLORIDE 20 MEQ/1
40 TABLET, EXTENDED RELEASE ORAL ONCE
Status: COMPLETED | OUTPATIENT
Start: 2021-02-19 | End: 2021-02-19

## 2021-02-18 NOTE — CONSULTS
[facility] Patient Status:  Inpatient    3/17/1945 MRN [id number]   [facility] 4NW-A Attending Mendez Norman DO   Hosp Day # 4 PCP Beatriz Salazar MD     Date of Admission: 2021  3:11 PM  Admission Diagno right TKR (Dr. Danisha Gilmore)   • LAPAROSCOPY DIAGNOSTIC (GYNE) Left 11/26/2007    Performed by Zoey Lambert MD at [facility]   • OTHER SURGICAL HISTORY  2007    ovarian cyst removal   • OTHER SURGICAL HISTORY  2-15-10    cystocele repair , Rfl:     •  Vitamin B-1 100 MG Oral Tab, Take 250 mg by mouth daily. , Disp: , Rfl:     •  Ferrous Sulfate (IRON) 28 MG Oral Tab, Take 65 mg by mouth daily. , Disp: , Rfl:     •  Cyanocobalamin (VITAMIN B-12) 1000 MCG Oral Tab, Take 1,000 mcg by mouth da sinus congestion  Cardiovascular: denies chest pain, PND, palpitations, edema, orthopnea, or syncope  Respiratory: denies SOB, dyspnea on exertion, denies cough, hemoptysis, wheezing, pleurisy  GI: denies nausea, vomiting, diarrhea, constipation, melena, a Value Date    WBC 5.5 02/18/2021    RBC 2.88 02/18/2021    HGB 9.2 02/18/2021    HCT 28.1 02/18/2021    MCV 97.6 02/18/2021    MCH 31.9 02/18/2021    MCHC 32.7 02/18/2021    RDW 15.2 02/18/2021    PLT 92.0 02/18/2021     Lab Results   Component Value Date

## 2021-02-18 NOTE — PHYSICAL THERAPY NOTE
PHYSICAL THERAPY TREATMENT NOTE - INPATIENT    Room Number: 270/871-T     Session: 1  Number of Visits to Meet Established Goals: 6    Presenting Problem: weakness, AMS      History related to current admission: Pt is a 76 y.o. female admitted 2/14/21 wit ULTRASOUND GUIDED FINE NEEDLE ASPIRATION OF  LYMPH NODE Left 5/29/2013    Performed by Eros Alvarez MD at [address]  \"I had a bad day yesterday. \"    Patient’s self-stated goal is to return home.      OBJECTIVE  Precau support. Pt assisted in scooting to EOB. Pt participatory in there-ex at bedside with good tolerance. Pt sit-stand with RW and MIN assist for force generation and correction of retropulsion. Pt ambulated 3ft with RW and MIN assist to bedside chair.  Pt ambu GOALS     Goal #1 Patient is able to demonstrate supine - sit EOB @ level: supervision      Goal #2 Patient is able to demonstrate transfers EOB to/from [facility] at assistance level: minimum assistance- MET  NEW: supervision      Goal #3 Patient is able to ambu

## 2021-02-18 NOTE — PROGRESS NOTES
[facility]    Nephrology Progress Note    Sonia Calzada Attending:  Binu Padilla DO     Cc: SUBHASH    SUBJECTIVE     Po intake slightly improved    Sp CT scan yesterday  No complaints otherwise    PHYSICAL EXAM   Vital signs: /60 (BP Loc 250 mg, 250 mg, Oral, Daily    •  Heparin Sodium (Porcine) 5000 UNIT/ML injection 5,000 Units, 5,000 Units, Subcutaneous, Q8H [facility]    •  acetaminophen (TYLENOL) tab 650 mg, 650 mg, Oral, Q6H PRN    •  ondansetron HCl (ZOFRAN) injection 4 mg, 4 mg, Intraveno in this location measuring 3.1 x 2.9 cm     on 11/5/2013. Right upper     paratracheal lymph node measures 3.0 x 3.0 cm on image 48, previously 3.8     x 3.0 cm.   Anterior mediastinal lymphadenopathy along the internal mammary     chains and prevascular s nodes appear     improved. PELVIC ORGANS:  No visible mass. Pelvic organs appropriate for patient     age. BONES:  Degenerative changes of spine.   No osteolytic or osteoblastic     lesion.                               =====    CONCLUSION: Continued follow-up is     suggested. If clinical symptoms persist then recommend CT.                    Dictated by (CST): Jennie Melendez MD on 2/16/2021 at 10:26 PM         Finalized by (CST): Jennie Melendez MD on 2/16/2021 at 10:27 PM        XR CHEST AP PORTAB 2/17, got IVFs pre and post contrast. Cr 1.32 today but that is still in her baseline range.     -- CT w no hydro  -- avoid nsaids and renally dose meds      Hypercalcemia   -- concern for lymphoma recurrence  -- vit d 25 normal   -- Vit D 1,25 very elevate

## 2021-02-18 NOTE — PROGRESS NOTES
[facility] Hospitalist Progress Note                                                                   [address]  3/17/1945    SUBJECTIVE:  Pt seen and examined.   States she feels SOB when acid  800 mcg Oral Daily   • Hydroxychloroquine Sulfate  200 mg Oral BID   • Metoprolol Succinate ER  50 mg Oral Daily Beta Blocker   • vitamin B-6 (pyridoxine)  100 mg Oral Daily   • Vitamin B-1  250 mg Oral Daily   • Heparin Sodium (Porcine)  5,000 Units pneumonia  - cont HAP coverage with zosyn  - procal elevated at 0.79    8. R pleural effusion   - due to lymphoma vs third spacing vs infection   - pulm c/s for possible thoracentesis     Heparin for dvt ppx  Dispo: inpt care.   Plan of care d/w pt who agree

## 2021-02-18 NOTE — PLAN OF CARE
Pt A/O x3-4. Vitals stable. Afebrile. Pt c/o SOB. Expiratory wheezing heard. Placed on 2L O2NC. Orders for PRN Duonebs, but when respiratory came to administer pt stated she felt better. Pt went down for CT CAP. IV fluids infusing per MAR.  Continued on Zos Problem: Impaired Functional Mobility  Goal: Achieve highest/safest level of mobility/gait  Description: Interventions:  - Assess patient's functional ability and stability  - Promote increasing activity/tolerance for mobility and gait  - Educate and eng

## 2021-02-18 NOTE — CM/SW NOTE
Met with patient to see if she had a preference for Acute rehab. [facility] would like [facility] as she has been there before. Vicky SOLIS notified of patient choice.  &  to remain available and supportive for discharge planning needs.

## 2021-02-18 NOTE — PROGRESS NOTES
Assumed care in AM , recvd up in in chair,afebrile , states feels cold , PT at bedside, up w/ 1 assist and use of walker , left hand and arm w/ edema and arthritic deformaties , use of pure wick ,  Takes in ct contrast po , has diahrrea shortly after, frus

## 2021-02-18 NOTE — PROGRESS NOTES
[facility]  Progress Note    Josiephine Cooler Patient Status:  Inpatient    3/17/1945 MRN [id number]   [facility] 4NW-A Attending Dari Jackson MD   Hosp Day # 4 PCP Roselyn Koroma MD     Subjective:    Feeling down this am  No d Absolute 0.13 0.00 - 0.70 x10(3) uL    Basophil Absolute 0.03 0.00 - 0.20 x10(3) uL    Immature Granulocyte Absolute 0.04 0.00 - 1.00 x10(3) uL    Neutrophil % 70.3 %    Lymphocyte % 16.3 %    Monocyte % 9.8 %    Eosinophil % 2.4 %    Basophil % 0.5 %    I on 2/17/2021 at 8:06 PM     Finalized by (CST): Ivana Dai MD on 2/17/2021 at 8:27 PM         Medications reviewed.     • piperacillin-tazobactam  3.375 g Intravenous Q8H   • gabapentin  700 mg Oral BID   • allopurinol  200 mg Oral Daily   • aspirin  81

## 2021-02-19 ENCOUNTER — APPOINTMENT (OUTPATIENT)
Dept: GENERAL RADIOLOGY | Facility: HOSPITAL | Age: 76
DRG: 840 | End: 2021-02-19
Attending: INTERNAL MEDICINE
Payer: MEDICARE

## 2021-02-19 LAB
ANION GAP SERPL CALC-SCNC: 5 MMOL/L (ref 0–18)
BASOPHILS # BLD AUTO: 0.03 X10(3) UL (ref 0–0.2)
BASOPHILS NFR BLD AUTO: 0.6 %
BUN BLD-MCNC: 16 MG/DL (ref 7–18)
BUN/CREAT SERPL: 13.2 (ref 10–20)
CALCIUM BLD-MCNC: 9.4 MG/DL (ref 8.5–10.1)
CHLORIDE SERPL-SCNC: 111 MMOL/L (ref 98–112)
CO2 SERPL-SCNC: 25 MMOL/L (ref 21–32)
CREAT BLD-MCNC: 1.21 MG/DL
DEPRECATED RDW RBC AUTO: 55.1 FL (ref 35.1–46.3)
EOSINOPHIL # BLD AUTO: 0.06 X10(3) UL (ref 0–0.7)
EOSINOPHIL NFR BLD AUTO: 1.1 %
ERYTHROCYTE [DISTWIDTH] IN BLOOD BY AUTOMATED COUNT: 15.5 % (ref 11–15)
GLUCOSE BLD-MCNC: 88 MG/DL (ref 70–99)
HAV IGM SER QL: 1.4 MG/DL (ref 1.6–2.6)
HCT VFR BLD AUTO: 25.9 %
HGB BLD-MCNC: 8.5 G/DL
IMM GRANULOCYTES # BLD AUTO: 0.03 X10(3) UL (ref 0–1)
IMM GRANULOCYTES NFR BLD: 0.6 %
LYMPHOCYTES # BLD AUTO: 1 X10(3) UL (ref 1–4)
LYMPHOCYTES NFR BLD AUTO: 18.9 %
MCH RBC QN AUTO: 32.1 PG (ref 26–34)
MCHC RBC AUTO-ENTMCNC: 32.8 G/DL (ref 31–37)
MCV RBC AUTO: 97.7 FL
MONOCYTES # BLD AUTO: 0.48 X10(3) UL (ref 0.1–1)
MONOCYTES NFR BLD AUTO: 9.1 %
NEUTROPHILS # BLD AUTO: 3.7 X10 (3) UL (ref 1.5–7.7)
NEUTROPHILS # BLD AUTO: 3.7 X10(3) UL (ref 1.5–7.7)
NEUTROPHILS NFR BLD AUTO: 69.7 %
OSMOLALITY SERPL CALC.SUM OF ELEC: 293 MOSM/KG (ref 275–295)
PLATELET # BLD AUTO: 87 10(3)UL (ref 150–450)
POTASSIUM SERPL-SCNC: 4.6 MMOL/L (ref 3.5–5.1)
RBC # BLD AUTO: 2.65 X10(6)UL
SODIUM SERPL-SCNC: 141 MMOL/L (ref 136–145)
WBC # BLD AUTO: 5.3 X10(3) UL (ref 4–11)

## 2021-02-19 PROCEDURE — 97110 THERAPEUTIC EXERCISES: CPT

## 2021-02-19 PROCEDURE — 97530 THERAPEUTIC ACTIVITIES: CPT

## 2021-02-19 PROCEDURE — 85025 COMPLETE CBC W/AUTO DIFF WBC: CPT | Performed by: INTERNAL MEDICINE

## 2021-02-19 PROCEDURE — 97535 SELF CARE MNGMENT TRAINING: CPT

## 2021-02-19 PROCEDURE — 80048 BASIC METABOLIC PNL TOTAL CA: CPT | Performed by: INTERNAL MEDICINE

## 2021-02-19 PROCEDURE — 71045 X-RAY EXAM CHEST 1 VIEW: CPT | Performed by: INTERNAL MEDICINE

## 2021-02-19 PROCEDURE — 83735 ASSAY OF MAGNESIUM: CPT | Performed by: INTERNAL MEDICINE

## 2021-02-19 PROCEDURE — 97116 GAIT TRAINING THERAPY: CPT

## 2021-02-19 RX ORDER — FUROSEMIDE 10 MG/ML
20 INJECTION INTRAMUSCULAR; INTRAVENOUS ONCE
Status: COMPLETED | OUTPATIENT
Start: 2021-02-19 | End: 2021-02-19

## 2021-02-19 NOTE — PLAN OF CARE
Pt A/Ox3-4. Vitals stable. No c/o pain. Pt feeling slight SOB, O2 saturations WNL but pt wanting O2 at night for comfort. Port-a-cath accessed using sterile technique. Pt having multiple small soft stools after oral contrast the day before.  Incontinent- ch Mobility  Goal: Achieve highest/safest level of mobility/gait  Description: Interventions:  - Assess patient's functional ability and stability  - Promote increasing activity/tolerance for mobility and gait  - Educate and engage patient/family in tolerated

## 2021-02-19 NOTE — PROGRESS NOTES
[address] [facility] Patient Status:  Inpatient    3/17/1945 MRN [id number]   [facility] 4NW-A Attending Betty Healy, DO   Hosp Day # 5 PCP Sonny Coulter MD     Pulm / Critical Care Progress Note     S: feels much be non-distended, positive BS.    Extremity: no edema     Recent Labs   Lab 02/17/21  0659 02/18/21  0641 02/19/21  0429   WBC 5.2 5.5 5.3   HGB 9.2* 9.2* 8.5*   HCT 28.7* 28.1* 25.9*   PLT 74.0* 92.0* 87.0*     No results for input(s): INR in the last 168 kaushal

## 2021-02-19 NOTE — OCCUPATIONAL THERAPY NOTE
OCCUPATIONAL THERAPY TREATMENT NOTE - INPATIENT     Room Number: 555/813-U  Session: 1   Number of Visits to Meet Established Goals: 5    Presenting Problem: AMS, SOB    History related to current admission: Pt is a 76 y.o. female admitted 2/14/21 with kolby ULTRASOUND GUIDED FINE NEEDLE ASPIRATION OF  LYMPH NODE Left 5/29/2013    Performed by Lynne Dawson MD at [address]    Patient self-stated goal is to work on her balance and her ankles so that she can walk with a cane aware of session/findings; With [facility] staff; All patient questions and concerns addressed    ASSESSMENT   Pt is progressing toward OT goals. Pt presents with deficits in endurance, pacing, use of adaptive techniques, balance, safety awareness, strength.   Recomm

## 2021-02-19 NOTE — PROGRESS NOTES
Assumed care in AM, skin pale , states feels cold all the time , hot chocolate ordered , warm blankets applied , PT at bedside , Afebrile , audible wheeze , Pulmonology consulted and at bedside , Nephrology at bedside , K+=3.2, replaced per electrolyte pro

## 2021-02-19 NOTE — PHYSICAL THERAPY NOTE
PHYSICAL THERAPY TREATMENT NOTE - INPATIENT    Room Number: 353/636-Z     Session: 2  Number of Visits to Meet Established Goals: 6    Presenting Problem: weakness, AMS      History related to current admission: Pt is a 76 y.o. female admitted 2/14/21 wit ULTRASOUND GUIDED FINE NEEDLE ASPIRATION OF  LYMPH NODE Left 5/29/2013    Performed by Gilford Barth, MD at [address]  \"thanks for the workout! \"    Patient’s self-stated goal is to return home.      OBJECTIVE  Precautio independence and safety.     Transfers    Supine<>Sit: Min A  Sit<>Stand: Min A  Gait: See above flow sheet  Chair Follow: YES  Sit<>Supine: NT  Stand<>Sit: Min A    Comments related to Mobility: Pt with consistent ataxia, and bilateral ankle weakness - Pt rolling at assistance level: minimum assistance      Goal #4     Goal #5     Goal #6     Goal Comments: Goals established on 2/16/2021; all goals ongoing as of 2/19/2021     Writer/PTA Student Britni Moore PPE: surgical mask, goggles and gloves at all times    P

## 2021-02-19 NOTE — PROGRESS NOTES
[facility]  Progress Note    Nichola Boast Patient Status:  Inpatient    3/17/1945 MRN [id number]   [facility] 4NW-A Attending Melody Blanco MD   Hosp Day # 5 PCP Prudencio Curry MD     Subjective:    Seen by pulmonary  Diagnos uL    Eosinophil Absolute 0.06 0.00 - 0.70 x10(3) uL    Basophil Absolute 0.03 0.00 - 0.20 x10(3) uL    Immature Granulocyte Absolute 0.03 0.00 - 1.00 x10(3) uL    Neutrophil % 69.7 %    Lymphocyte % 18.9 %    Monocyte % 9.1 %    Eosinophil % 1.1 %    Ania cholecalciferol  1,000 Units Oral Daily   • Vitamin B-12  1,000 mcg Oral Daily   • ferrous sulfate  325 mg Oral Daily   • folic acid  003 mcg Oral Daily   • Hydroxychloroquine Sulfate  200 mg Oral BID   • Metoprolol Succinate ER  50 mg Oral Daily Beta Bloc

## 2021-02-19 NOTE — CM/SW NOTE
Care Progression Note:  Active Acute Medical Issue:   Hypercalcemia     Other Contributing Medical Factors/Dx. :     Confusion    Urinary frequency    Acute kidney injury ([facility] [address]    Length of stay: 5  GMLOS: 6.7  Avoidable Delays: None  Discharge Barriers: XR

## 2021-02-19 NOTE — PROGRESS NOTES
[facility]    Nephrology Progress Note    [place] Looney Attending:  Mendez Norman DO     Cc: SUBHASH    SUBJECTIVE     Po intake slightly improved    No complaints otherwise    PHYSICAL EXAM   Vital signs: /59 (BP Location: Right arm)   Pu Daily    •  Heparin Sodium (Porcine) 5000 UNIT/ML injection 5,000 Units, 5,000 Units, Subcutaneous, Q8H [address]    •  acetaminophen (TYLENOL) tab 650 mg, 650 mg, Oral, Q6H PRN    •  ondansetron HCl (ZOFRAN) injection 4 mg, 4 mg, Intravenous, Q6H PRN    •  Meto 2/19/2021 at 5:59 AM         Finalized by (CST): Stas Bean MD on 2/19/2021 at 6:00 AM        CT CHEST+ABDOMEN+PELVIS(ALL CNTRST ONLY)(CPT=71260/62340)   Final Result    PROCEDURE:  CT CHEST+ABDOMEN+PELVIS(ALL CNTRST ONLY)(MJE=44693/40568)         COM thrombus or attenuation. ABDOMEN/PELVIS:    LIVER:  No enlargement, atrophy, abnormal density, or significant focal     lesion. Periportal edema. BILIARY:  Calcified gallstones in gallbladder lumen. No bile duct     dilatation.     PANCREAS: Retroperitoneal lymphadenopathy appears similar compared to 11/5/2013. 5. Decreased pelvic lymphadenopathy. 6. Increased moderate to marked splenomegaly. 7. Mild ascites. 8. Cholelithiasis. 9. Uncomplicated colonic diverticula. fatigue with a possible UTI. FINDINGS:  Lung volumes are low normal.  There are new patchy opacities at     both lung bases with partial silhouette of the hemidiaphragm contours,     left greater than right.   There is also blunting of the CP you for allowing me to participate in the care of this patient. Please do not hesitate to call with questions or concerns.        Kris Smith MD  [address] Nephrology

## 2021-02-19 NOTE — PROGRESS NOTES
[facility] Hospitalist Progress Note                                                                   [address] [place]  3/17/1945    SUBJECTIVE:  Pt seen and examined.   Feels that breathing is im 1,000 mcg Oral Daily   • ferrous sulfate  325 mg Oral Daily   • folic acid  255 mcg Oral Daily   • Hydroxychloroquine Sulfate  200 mg Oral BID   • Metoprolol Succinate ER  50 mg Oral Daily Beta Blocker   • vitamin B-6 (pyridoxine)  100 mg Oral Daily   • Vi monitor     7. R lobar pneumonia  - cont HAP coverage with zosyn  - procal elevated at 0.79    8. R pleural effusion   - due to lymphoma vs third spacing vs infection   - pulm c/s for possible thoracentesis appreciated, will review repeat CXR today   - cons

## 2021-02-19 NOTE — PLAN OF CARE
Pt AOx3. VSS. No c/o pain. CXR this AM to eval pleural effusion/PNA. Kidney function improved. 1600--lasix given per pulmonology. Worked with PT this afternoon.

## 2021-02-20 LAB
ALBUMIN SERPL-MCNC: 1.9 G/DL (ref 3.4–5)
ANION GAP SERPL CALC-SCNC: 8 MMOL/L (ref 0–18)
BUN BLD-MCNC: 13 MG/DL (ref 7–18)
BUN/CREAT SERPL: 10 (ref 10–20)
CALCIUM BLD-MCNC: 10.1 MG/DL (ref 8.5–10.1)
CHLORIDE SERPL-SCNC: 105 MMOL/L (ref 98–112)
CO2 SERPL-SCNC: 25 MMOL/L (ref 21–32)
CREAT BLD-MCNC: 1.3 MG/DL
GLUCOSE BLD-MCNC: 96 MG/DL (ref 70–99)
HAV IGM SER QL: 1.4 MG/DL (ref 1.6–2.6)
OSMOLALITY SERPL CALC.SUM OF ELEC: 286 MOSM/KG (ref 275–295)
PHOSPHATE SERPL-MCNC: 2.6 MG/DL (ref 2.5–4.9)
POTASSIUM SERPL-SCNC: 3.6 MMOL/L (ref 3.5–5.1)
SODIUM SERPL-SCNC: 138 MMOL/L (ref 136–145)

## 2021-02-20 PROCEDURE — 80069 RENAL FUNCTION PANEL: CPT | Performed by: INTERNAL MEDICINE

## 2021-02-20 PROCEDURE — 83735 ASSAY OF MAGNESIUM: CPT | Performed by: INTERNAL MEDICINE

## 2021-02-20 RX ORDER — POTASSIUM CHLORIDE 20 MEQ/1
40 TABLET, EXTENDED RELEASE ORAL EVERY 4 HOURS
Status: COMPLETED | OUTPATIENT
Start: 2021-02-20 | End: 2021-02-20

## 2021-02-20 RX ORDER — MAGNESIUM OXIDE 400 MG (241.3 MG MAGNESIUM) TABLET
800 TABLET ONCE
Status: COMPLETED | OUTPATIENT
Start: 2021-02-20 | End: 2021-02-20

## 2021-02-20 NOTE — PROGRESS NOTES
[facility]  Progress Note    Jerome Avila Patient Status:  Inpatient    3/17/1945 MRN [id number]   [facility] 4NW-A Attending Obinna Rain MD   [facility] Day # 6 PCP Erik Reynoso MD     Subjective:    Feeling ok  States coughin 111 111 111 111 105   CO2 29.0   < > 25.0 20.0* 24.0 25.0 25.0   ALKPHO 60  --   --   --   --   --   --    AST 27  --   --   --   --   --   --    ALT 22  --   --   --   --   --   --    BILT 0.5  --   --   --   --   --   --    TP 6.1*  --   --   --   --   - to participate in the care of your patient.     Sherin Hope, DO

## 2021-02-20 NOTE — PROGRESS NOTES
[address] [facility] Patient Status:  Inpatient    3/17/1945 MRN [id number]   [facility] 4NW-A Attending Darryn John, DO   Hosp Day # 6 PCP Louise Paredes MD     SUBJECTIVE: Pt denies SOB.       OBJECTIVE:  /67 ( mg, Intravenous, Q8H PRN      Physical Exam:                          General: alert, cooperative, in NAD                          HEENT: oropharynx clear without erythema or exudates, moist mucous membranes                          Lungs: diminished over

## 2021-02-20 NOTE — PLAN OF CARE
Assumed care of pt at [address]. Pt denies pain at this time. 94% on room air. IS given and pt instructed on use. Pt remains on iv antibx. Lungs diminished. Sob with exertion. Abdomen soft bs hypoactive. Bed alarm on.

## 2021-02-20 NOTE — PLAN OF CARE
Patient alert and oriented x4. Patient denies discomfort, vital signs stable. Patient with occasional non-productive cough, respirations non-labored, lungs sound diminished, O2 sat 95-97% on room air. Patient afebrile. O2 sat monitored.

## 2021-02-20 NOTE — PROGRESS NOTES
[facility] Hospitalist Progress Note                                                                   [address] [place]  3/17/1945    SUBJECTIVE:  Pt seen and examined.   Feels that breathing is im 1,000 Units Oral Daily   • Vitamin B-12  1,000 mcg Oral Daily   • ferrous sulfate  325 mg Oral Daily   • folic acid  315 mcg Oral Daily   • Hydroxychloroquine Sulfate  200 mg Oral BID   • Metoprolol Succinate ER  50 mg Oral Daily Beta Blocker   • vitamin B Appreciate hem/onc eval  Cont hyperCA tx  CT c/a/p reviewed by hem/onc, plan for outpt PET    6.   Thrombocytopenia   - suspect related to lymphoma  - cont to monitor     7. R lobar pneumonia  - cont HAP coverage with zosyn  - procal elevated at 0.79    8

## 2021-02-20 NOTE — PROGRESS NOTES
[facility]    Nephrology Progress Note    Anabela Jose Attending:  Martha Salcido DO     Cc: SUBHASH    SUBJECTIVE     Po intake slightly improved    Breathing improved  No diarrhea today  No complaints otherwise    PHYSICAL EXAM   Vital signs: B-6 (pyridoxine) tab 100 mg, 100 mg, Oral, Daily    •  Thiamine HCl (Vitamin B-1) tab 250 mg, 250 mg, Oral, Daily    •  Heparin Sodium (Porcine) 5000 UNIT/ML injection 5,000 Units, 5,000 Units, Subcutaneous, Q8H [facility]    •  acetaminophen (TYLENOL) tab 650 mg Dictated by (CST): Jesusita Porter MD on 2/19/2021 at 5:59 AM         Finalized by (CST): Jesusita Porter MD on 2/19/2021 at 6:00 AM        CT CHEST+ABDOMEN+PELVIS(ALL CNTRST ONLY)(CPT=71260/92333)   Final Result    PROCEDURE:  CT CHEST+ aneurysm    VASCULATURE:  No visible pulmonary arterial thrombus or attenuation. ABDOMEN/PELVIS:    LIVER:  No enlargement, atrophy, abnormal density, or significant focal     lesion. Periportal edema.     BILIARY:  Calcified gallstones in gallbl is improved compared     to 11/5/2013. 4. Retroperitoneal lymphadenopathy appears similar compared to 11/5/2013. 5. Decreased pelvic lymphadenopathy. 6. Increased moderate to marked splenomegaly. 7. Mild ascites. 8. Cholelithiasis.     9. Unc Technologist)  Patient has a     great deal of fatigue with a possible UTI.                FINDINGS:  Lung volumes are low normal.  There are new patchy opacities at     both lung bases with partial silhouette of the hemidiaphragm contours,     left greater protocol    Anemia/Thrombocytopenia  -- monitor    Effusion  -- pulm consulted. Ok for gentle diuresis prn, sp lasix yesterday     D/w Dr. Genaro Montana    Thank you for allowing me to participate in the care of this patient.  Please do not hesitate to call with

## 2021-02-21 LAB
ALBUMIN SERPL-MCNC: 2.1 G/DL (ref 3.4–5)
ANION GAP SERPL CALC-SCNC: 5 MMOL/L (ref 0–18)
BUN BLD-MCNC: 15 MG/DL (ref 7–18)
BUN/CREAT SERPL: 9.7 (ref 10–20)
CALCIUM BLD-MCNC: 11.1 MG/DL (ref 8.5–10.1)
CHLORIDE SERPL-SCNC: 108 MMOL/L (ref 98–112)
CO2 SERPL-SCNC: 25 MMOL/L (ref 21–32)
CREAT BLD-MCNC: 1.54 MG/DL
GLUCOSE BLD-MCNC: 92 MG/DL (ref 70–99)
HAV IGM SER QL: 1.4 MG/DL (ref 1.6–2.6)
OSMOLALITY SERPL CALC.SUM OF ELEC: 286 MOSM/KG (ref 275–295)
PHOSPHATE SERPL-MCNC: 2.8 MG/DL (ref 2.5–4.9)
POTASSIUM SERPL-SCNC: 4.5 MMOL/L (ref 3.5–5.1)
SODIUM SERPL-SCNC: 138 MMOL/L (ref 136–145)

## 2021-02-21 PROCEDURE — 80069 RENAL FUNCTION PANEL: CPT | Performed by: INTERNAL MEDICINE

## 2021-02-21 PROCEDURE — P9047 ALBUMIN (HUMAN), 25%, 50ML: HCPCS | Performed by: INTERNAL MEDICINE

## 2021-02-21 PROCEDURE — 83735 ASSAY OF MAGNESIUM: CPT | Performed by: INTERNAL MEDICINE

## 2021-02-21 RX ORDER — ALBUMIN (HUMAN) 12.5 G/50ML
25 SOLUTION INTRAVENOUS EVERY 6 HOURS
Status: COMPLETED | OUTPATIENT
Start: 2021-02-21 | End: 2021-02-22

## 2021-02-21 RX ORDER — ALBUMIN (HUMAN) 12.5 G/50ML
25 SOLUTION INTRAVENOUS EVERY 6 HOURS
Status: DISCONTINUED | OUTPATIENT
Start: 2021-02-21 | End: 2021-02-21

## 2021-02-21 RX ORDER — ALBUMIN (HUMAN) 12.5 G/50ML
25 SOLUTION INTRAVENOUS ONCE
Status: COMPLETED | OUTPATIENT
Start: 2021-02-21 | End: 2021-02-21

## 2021-02-21 NOTE — PROGRESS NOTES
[facility] Hospitalist Progress Note                                                                   [address] [place]  3/17/1945    SUBJECTIVE:  Pt seen and examined.   Received pamidronate 2/20 Daily   • aspirin  81 mg Oral Nightly   • cholecalciferol  1,000 Units Oral Daily   • Vitamin B-12  1,000 mcg Oral Daily   • ferrous sulfate  325 mg Oral Daily   • folic acid  029 mcg Oral Daily   • Hydroxychloroquine Sulfate  200 mg Oral BID   • Metoprolo IVFs2/18  Given Cr bump today, renal ordered IV albumin      5. Angioimmunoblastic lymphoma   Appreciate hem/onc eval  Cont hyperCA tx  CT c/a/p reviewed by hem/onc, plan for outpt PET    6.   Thrombocytopenia   - suspect related to lymphoma  - cont to [facility]

## 2021-02-21 NOTE — PROGRESS NOTES
[facility]    Nephrology Progress Note    Carli Crenshaw Attending:  Payton Goddard DO     Cc: SUBHASH    SUBJECTIVE     Po intake slightly improved    Breathing improved  No diarrhea today  No complaints otherwise    PHYSICAL EXAM   Vital signs: (Vitamin B-1) tab 250 mg, 250 mg, Oral, Daily    •  Heparin Sodium (Porcine) 5000 UNIT/ML injection 5,000 Units, 5,000 Units, Subcutaneous, Q8H [address]    •  acetaminophen (TYLENOL) tab 650 mg, 650 mg, Oral, Q6H PRN    •  ondansetron HCl (ZOFRAN) injection 4 m 2/19/2021 at 5:59 AM         Finalized by (CST): Joby Hatch MD on 2/19/2021 at 6:00 AM        CT CHEST+ABDOMEN+PELVIS(ALL CNTRST ONLY)(CPT=71260/92761)   Final Result    PROCEDURE:  CT CHEST+ABDOMEN+PELVIS(ALL CNTRST ONLY)(WFY=24151/82035)         COM thrombus or attenuation. ABDOMEN/PELVIS:    LIVER:  No enlargement, atrophy, abnormal density, or significant focal     lesion. Periportal edema. BILIARY:  Calcified gallstones in gallbladder lumen. No bile duct     dilatation.     PANCREAS: Retroperitoneal lymphadenopathy appears similar compared to 11/5/2013. 5. Decreased pelvic lymphadenopathy. 6. Increased moderate to marked splenomegaly. 7. Mild ascites. 8. Cholelithiasis. 9. Uncomplicated colonic diverticula. fatigue with a possible UTI. FINDINGS:  Lung volumes are low normal.  There are new patchy opacities at     both lung bases with partial silhouette of the hemidiaphragm contours,     left greater than right.   There is also blunting of the CP to consider restarting IVFs but had resp issues previously requiring lasix x 1      Hypomagnesium/Hypokalemia  -- replete and monitor per protocol    Anemia/Thrombocytopenia  -- monitor    Effusion  -- pulm consulted.  Ok for gentle diuresis prn, sp lasix y

## 2021-02-21 NOTE — PLAN OF CARE
Patient alert and oriented x4. Calm and cooperative with care. Vital signs stable. Room air. SOB on exertion noted. No chest pain. Maintained on IV antibiotics for Pneumonia. Non-productive cough observed.  Low grade temperature noted, PRN Tylenol given w

## 2021-02-22 LAB
ALBUMIN SERPL ELPH-MCNC: 3.12 G/DL (ref 3.75–5.21)
ALBUMIN SERPL-MCNC: 2.9 G/DL (ref 3.4–5)
ALBUMIN/GLOB SERPL: 1.04 {RATIO} (ref 1–2)
ALPHA1 GLOB SERPL ELPH-MCNC: 0.49 G/DL (ref 0.19–0.46)
ALPHA2 GLOB SERPL ELPH-MCNC: 0.74 G/DL (ref 0.48–1.05)
ANION GAP SERPL CALC-SCNC: 7 MMOL/L (ref 0–18)
B-GLOBULIN SERPL ELPH-MCNC: 0.77 G/DL (ref 0.68–1.23)
BUN BLD-MCNC: 17 MG/DL (ref 7–18)
BUN/CREAT SERPL: 8.9 (ref 10–20)
CALCIUM BLD-MCNC: 11.7 MG/DL (ref 8.5–10.1)
CHLORIDE SERPL-SCNC: 104 MMOL/L (ref 98–112)
CO2 SERPL-SCNC: 26 MMOL/L (ref 21–32)
CREAT BLD-MCNC: 1.9 MG/DL
CREAT UR-SCNC: 29.6 MG/DL
DEPRECATED RDW RBC AUTO: 55.3 FL (ref 35.1–46.3)
ERYTHROCYTE [DISTWIDTH] IN BLOOD BY AUTOMATED COUNT: 15.5 % (ref 11–15)
GAMMA GLOB SERPL ELPH-MCNC: 0.98 G/DL (ref 0.62–1.7)
GLUCOSE BLD-MCNC: 83 MG/DL (ref 70–99)
HAV IGM SER QL: 2.2 MG/DL (ref 1.6–2.6)
HCT VFR BLD AUTO: 26.7 %
HGB BLD-MCNC: 8.7 G/DL
KAPPA LC FREE SER-MCNC: 6.81 MG/DL (ref 0.33–1.94)
KAPPA LC FREE/LAMBDA FREE SER NEPH: 0.92 {RATIO} (ref 0.26–1.65)
LAMBDA LC FREE SERPL-MCNC: 7.36 MG/DL (ref 0.57–2.63)
M PROTEIN MFR SERPL ELPH: 6.1 G/DL (ref 6.4–8.2)
MCH RBC QN AUTO: 31.9 PG (ref 26–34)
MCHC RBC AUTO-ENTMCNC: 32.6 G/DL (ref 31–37)
MCV RBC AUTO: 97.8 FL
OSMOLALITY SERPL CALC.SUM OF ELEC: 285 MOSM/KG (ref 275–295)
PHOSPHATE SERPL-MCNC: 3.4 MG/DL (ref 2.5–4.9)
PLATELET # BLD AUTO: 104 10(3)UL (ref 150–450)
POTASSIUM SERPL-SCNC: 3.7 MMOL/L (ref 3.5–5.1)
RBC # BLD AUTO: 2.73 X10(6)UL
SODIUM SERPL-SCNC: 137 MMOL/L (ref 136–145)
SODIUM SERPL-SCNC: 96 MMOL/L
UUN UR-MCNC: 157 MG/DL
WBC # BLD AUTO: 5.3 X10(3) UL (ref 4–11)

## 2021-02-22 PROCEDURE — 97116 GAIT TRAINING THERAPY: CPT

## 2021-02-22 PROCEDURE — P9047 ALBUMIN (HUMAN), 25%, 50ML: HCPCS | Performed by: INTERNAL MEDICINE

## 2021-02-22 PROCEDURE — 84540 ASSAY OF URINE/UREA-N: CPT | Performed by: INTERNAL MEDICINE

## 2021-02-22 PROCEDURE — 83735 ASSAY OF MAGNESIUM: CPT | Performed by: INTERNAL MEDICINE

## 2021-02-22 PROCEDURE — 80069 RENAL FUNCTION PANEL: CPT | Performed by: INTERNAL MEDICINE

## 2021-02-22 PROCEDURE — 84300 ASSAY OF URINE SODIUM: CPT | Performed by: INTERNAL MEDICINE

## 2021-02-22 PROCEDURE — 87493 C DIFF AMPLIFIED PROBE: CPT | Performed by: INTERNAL MEDICINE

## 2021-02-22 PROCEDURE — 85027 COMPLETE CBC AUTOMATED: CPT | Performed by: INTERNAL MEDICINE

## 2021-02-22 PROCEDURE — 97110 THERAPEUTIC EXERCISES: CPT

## 2021-02-22 PROCEDURE — 82570 ASSAY OF URINE CREATININE: CPT | Performed by: INTERNAL MEDICINE

## 2021-02-22 RX ORDER — POTASSIUM CHLORIDE 20 MEQ/1
40 TABLET, EXTENDED RELEASE ORAL ONCE
Status: COMPLETED | OUTPATIENT
Start: 2021-02-22 | End: 2021-02-22

## 2021-02-22 RX ORDER — SODIUM CHLORIDE 9 MG/ML
INJECTION, SOLUTION INTRAVENOUS CONTINUOUS
Status: DISCONTINUED | OUTPATIENT
Start: 2021-02-22 | End: 2021-02-24

## 2021-02-22 NOTE — PROGRESS NOTES
[facility]    Nephrology Progress Note    Grey Andujar Attending:  Maryanne Rodriguez DO       SUBJECTIVE:     Breathing is ok  No leg swelling  No urinary complaints    PHYSICAL EXAM:     Vital Signs: /61 (BP Location: Right arm)   Pulse 32.6 02/22/2021    RDW 15.5 (H) 02/22/2021    NEPRELIM 3.70 02/19/2021    NEUTABS 6.22 11/06/2013    LYMPHABS 4.83 (H) 11/06/2013    EOSABS 0.00 11/06/2013    BASABS 0.00 11/06/2013    NEUT 48 11/06/2013    LYMPH 38 (H) 11/06/2013    MON 13 (H) 11/06/2013 Oral, Daily    •  ferrous sulfate EC tab 325 mg, 325 mg, Oral, Daily    •  folic acid (FOLVITE) tab 800 mcg, 800 mcg, Oral, Daily    •  Hydroxychloroquine Sulfate (PLAQUENIL) tab 200 mg, 200 mg, Oral, BID    •  Metoprolol Succinate ER (Toprol XL) 24 hr tab

## 2021-02-22 NOTE — PROGRESS NOTES
[facility] Hospitalist Progress Note                                                                   [address] [place], [place]. \[facility].\""  3/17/1945    SUBJECTIVE:  Pt seen and examined. No F/C, N/V.   Feels tired Metoprolol Succinate ER  50 mg Oral Daily Beta Blocker   • vitamin B-6 (pyridoxine)  100 mg Oral Daily   • Vitamin B-1  250 mg Oral Daily   • Heparin Sodium (Porcine)  5,000 Units Subcutaneous Q8H [address]     Continuous Infusions:   • sodium chloride 125 mL/hr hyperCA tx  CT c/a/p reviewed by hem/onc, plan for outpt PET    6. Thrombocytopenia   - suspect related to lymphoma  - cont to monitor     7. R lobar pneumonia  - cont HAP coverage with zosyn (day 6/7)  - procal elevated at 0.79    8. R pleural effusion

## 2021-02-22 NOTE — PLAN OF CARE
Patient denies discomfort. Patient with occasional non-productive cough, respirations non-labored, lungs sound diminished, O2 sat 93% on room air. Orthostatic BP  relayed to Dr. Jeanie Fatima. Patient afebrile. Post void residual 155 ml.

## 2021-02-22 NOTE — PLAN OF CARE
A&Ox4, RA- No CO of SOB amd o2 sat was 93-95%, No tele. Pure wick removed, pt up to commode or bed pan. Denies nausea and pain. Left chest port, no blood return, infusing albumin and zosyn.  Call light within reach, bed in lowest position, VSS, will continu

## 2021-02-22 NOTE — PHYSICAL THERAPY NOTE
PHYSICAL THERAPY TREATMENT NOTE - INPATIENT    Room Number: 293/034-A     Session: 3  Number of Visits to Meet Established Goals: 6    Presenting Problem: weakness, AMS      History related to current admission: Pt is a 76 y.o. female admitted 2/14/21 wit ULTRASOUND GUIDED FINE NEEDLE ASPIRATION OF  LYMPH NODE Left 5/29/2013    Performed by Andrew Rich MD at [address]  \"I was just about to eat, but Ill get up to the chair. Im not walking much today. \"    Patient’s dennis goggles. Pt with mask donned during session. Pt lying in bed and agreed to PT. With [facility] elevated, pt with supervision for supine<>sit to EOB. Pt with difficulty scooting to EOB due to fatigue. Pt instructed in proper hand placement with sit<>stand.  Pt re-educate;Strengthening;Transfer training;Balance training  Rehab Potential : Good  Frequency (Obs): 3-5x/week    CURRENT GOALS     Goal #1 Patient is able to demonstrate supine - sit EOB @ level: supervision-Met  Upgrade to Mod I with HOB flat      Goal

## 2021-02-22 NOTE — DIETARY NOTE
[facility]     Admitting diagnosis:  Urinary frequency [R35.0]  Hypercalcemia [E83.52]  Confusion [R41.0]  Acute kidney injury ([address] [N17.9]    Ht: 167.6 cm (5' 6\")  Wt: 59.9 kg (132 lb 1.6 oz).    Body mass

## 2021-02-22 NOTE — PLAN OF CARE
Pt AOx3. VSS. Up to chair and worked with PT this AM. Had two large loose BMs this morning--hospitalist notified, C diff testing ordered for next loose stool. IVF re-ordered by nephrology. IV abx per [facility] for PNA. On RA. Potassium replaced per protocol.

## 2021-02-22 NOTE — PROGRESS NOTES
PHYSICAL MEDICINE AND REHABILITATION CONSULTATION       Location [address] [place] [address] 4NW-A Attending Mendez Norman DO   Hosp Day # 8 PCP Beatriz Salazar MD     Patient Identification  Montana Looney is a 76year old female.   :  3/17/1945  Admit Date 2/23/2021] gabapentin (NEURONTIN) cap 700 mg, 700 mg, Oral, Daily    •  [COMPLETED] magnesium sulfate IVPB premix 4 g, 4 g, Intravenous, Once    •  [COMPLETED] Albumin Human (ALBUMINAR) 25 % solution 25 g, 25 g, Intravenous, Once    •  [COMPLETED] Albumin XL) 24 hr tab 50 mg, 50 mg, Oral, Daily Beta Blocker    •  vitamin B-6 (pyridoxine) tab 100 mg, 100 mg, Oral, Daily    •  Thiamine HCl (Vitamin B-1) tab 250 mg, 250 mg, Oral, Daily    •  Heparin Sodium (Porcine) 5000 UNIT/ML injection 5,000 Units, 5,000 Un WITH INTRAOCULAR LENS IMPLANT 21532 Left 6/17/2009    Performed by Courtney Munoz MD at [address] [place] [address]   • ULTRASOUND GUIDED FINE NEEDLE ASPIRATION OF  LYMPH NODE Left 5/29/2013    Performed by Chloe Navarro MD at [address] [place] [address] lives alone in 2 story home. Pt has been sleeping on the main level of the home but her shower is upstairs. Pt independent with ADL and mobility. Pt ambulates with RW.  Pt has B AFOs but reports she only wears then when she is walking a long distance and ne found.    ASSESSMENT:    Deficits of self care and mobility secondary to hypercalcemia, workup underway  SUBHASH on CDK Stage 3  Metabolic encephelopathy, resolving  Balance deficits  History of GBS, with b/l foot drop  History of NHL        Recommendations: a

## 2021-02-22 NOTE — PROGRESS NOTES
[facility]  Progress Note    Pravin Correa Patient Status:  Inpatient    3/17/1945 MRN [id number]   [facility] 4NW-A Attending Adolfo Nolen MD   [facility] Day # 8 PCP Keyon Lanza MD     Subjective:    Jeff Barone  Dry lips     Bobbe Nick 11.0 - 15.0 %    RDW-SD 55.3 (H) 35.1 - 46.3 fL       Recent Labs   Lab 02/20/21  0536 02/21/21  0520 02/22/21  0623   GLU 96 92 83   BUN 13 15 17   CREATSERUM 1.30* 1.54* 1.90*   GFRAA 46* 38* 29*   GFRNAA 40* 33* 25*   CA 10.1 11.1* 11.7*   ALB 1.9* 2.1* allowing me to participate in the care of your patient.     Alexandru BLACKWELL

## 2021-02-23 LAB
ALBUMIN SERPL-MCNC: 2.4 G/DL (ref 3.4–5)
ANION GAP SERPL CALC-SCNC: 6 MMOL/L (ref 0–18)
BUN BLD-MCNC: 17 MG/DL (ref 7–18)
BUN/CREAT SERPL: 8.4 (ref 10–20)
C DIFF TOX B STL QL: NEGATIVE
CALCIUM BLD-MCNC: 10 MG/DL (ref 8.5–10.1)
CHLORIDE SERPL-SCNC: 110 MMOL/L (ref 98–112)
CO2 SERPL-SCNC: 24 MMOL/L (ref 21–32)
CREAT BLD-MCNC: 2.03 MG/DL
CREAT UR-SCNC: 63.5 MG/DL
GLUCOSE BLD-MCNC: 74 MG/DL (ref 70–99)
HAV IGM SER QL: 1.6 MG/DL (ref 1.6–2.6)
OSMOLALITY SERPL CALC.SUM OF ELEC: 290 MOSM/KG (ref 275–295)
PHOSPHATE SERPL-MCNC: 3 MG/DL (ref 2.5–4.9)
POTASSIUM SERPL-SCNC: 3.8 MMOL/L (ref 3.5–5.1)
SODIUM SERPL-SCNC: 140 MMOL/L (ref 136–145)
SODIUM SERPL-SCNC: 85 MMOL/L

## 2021-02-23 PROCEDURE — 84300 ASSAY OF URINE SODIUM: CPT | Performed by: INTERNAL MEDICINE

## 2021-02-23 PROCEDURE — 82570 ASSAY OF URINE CREATININE: CPT | Performed by: INTERNAL MEDICINE

## 2021-02-23 PROCEDURE — 83735 ASSAY OF MAGNESIUM: CPT | Performed by: INTERNAL MEDICINE

## 2021-02-23 PROCEDURE — 80069 RENAL FUNCTION PANEL: CPT | Performed by: INTERNAL MEDICINE

## 2021-02-23 RX ORDER — MAGNESIUM OXIDE 400 MG (241.3 MG MAGNESIUM) TABLET
400 TABLET ONCE
Status: COMPLETED | OUTPATIENT
Start: 2021-02-23 | End: 2021-02-23

## 2021-02-23 RX ORDER — POTASSIUM CHLORIDE 20 MEQ/1
40 TABLET, EXTENDED RELEASE ORAL ONCE
Status: DISCONTINUED | OUTPATIENT
Start: 2021-02-23 | End: 2021-02-23

## 2021-02-23 NOTE — PROGRESS NOTES
[facility]  Progress Note    Juliane Tena Patient Status:  Inpatient    3/17/1945 MRN [id number]   [facility] 4NW-A Attending Grey Carey MD   [facility] Day # 9 PCP Louise Paredes MD     Subjective:    Dewanda Edmore  Dry lips     Rheta Beans GFR, Non- 23 (L) >=60    GFR, -American 27 (L) >=60    Albumin 2.4 (L) 3.4 - 5.0 g/dL    Phosphorus 3.0 2.5 - 4.9 mg/dL       Recent Labs   Lab 02/21/21  0520 02/22/21  0623 02/23/21  0841   GLU 92 83 74   BUN 15 17 17   CREATSERUM 1 follow        Thank you for allowing me to participate in the care of your patient.     Alexandru LTJIXAMIRA

## 2021-02-23 NOTE — PROGRESS NOTES
[facility]    Nephrology Progress Note    Simi Hand Attending:  Bryanna Backers, DO       SUBJECTIVE:     No cp, sob, leg swelling, urinary complaints    PHYSICAL EXAM:     Vital Signs: /61 (BP Location: Right arm)   Pulse 79   Temp 9 RDW 15.5 (H) 02/22/2021    NEPRELIM 3.70 02/19/2021    NEUTABS 6.22 11/06/2013    LYMPHABS 4.83 (H) 11/06/2013    EOSABS 0.00 11/06/2013    BASABS 0.00 11/06/2013    NEUT 48 11/06/2013    LYMPH 38 (H) 11/06/2013    MON 13 (H) 11/06/2013    EOS 1 11/05/2013 mcg, Oral, Daily    •  ferrous sulfate EC tab 325 mg, 325 mg, Oral, Daily    •  folic acid (FOLVITE) tab 800 mcg, 800 mcg, Oral, Daily    •  Hydroxychloroquine Sulfate (PLAQUENIL) tab 200 mg, 200 mg, Oral, BID    •  Metoprolol Succinate ER (Toprol XL) 24 h

## 2021-02-23 NOTE — CM/SW NOTE
Care Progression Note:  Active Acute Medical Issue:   Urinary frequency [R35.0]  Hypercalcemia [E83.52]  Confusion [R41.0]  Acute kidney injury ([facility] [address] [N17.9]    Other Contributing Medical Factors/Dx.:   Non-Hodgkin's lymphoma, CKD 3 3, Guillain-Barré, infl

## 2021-02-23 NOTE — PROGRESS NOTES
[facility] Hospitalist Progress Note                                                                   [address] [facility]  3/17/1945    SUBJECTIVE:  Pt seen and examined. No F/C, N/V.   [place] Hydroxychloroquine Sulfate  200 mg Oral BID   • Metoprolol Succinate ER  50 mg Oral Daily Beta Blocker   • vitamin B-6 (pyridoxine)  100 mg Oral Daily   • Vitamin B-1  250 mg Oral Daily   • Heparin Sodium (Porcine)  5,000 Units Subcutaneous Q8H [address]     Con lymphoma   Appreciate hem/onc eval  Cont hyperCA tx  CT c/a/p reviewed by hem/onc, plan for outpt PET    6.   Thrombocytopenia   - suspect related to lymphoma  - cont to monitor     7. R lobar pneumonia  - cont HAP coverage with zosyn (day 7/10)  - procal e

## 2021-02-23 NOTE — PLAN OF CARE
A&Ox4, RA, no tele. Meds taken whole w/ apple sauce. Denies pain and nausea. Had another large loose stool, sample sent for Cdiff, results pending. Left hand IV infusing NS 0.9 @ 125ml/hr, and IV zosyn. Call light within reach bed in lowest position, VSS.

## 2021-02-24 LAB
ALBUMIN SERPL-MCNC: 2.4 G/DL (ref 3.4–5)
ANION GAP SERPL CALC-SCNC: 7 MMOL/L (ref 0–18)
BUN BLD-MCNC: 19 MG/DL (ref 7–18)
BUN/CREAT SERPL: 9.3 (ref 10–20)
CALCIUM BLD-MCNC: 9.9 MG/DL (ref 8.5–10.1)
CHLORIDE SERPL-SCNC: 111 MMOL/L (ref 98–112)
CO2 SERPL-SCNC: 24 MMOL/L (ref 21–32)
CREAT BLD-MCNC: 2.05 MG/DL
GLUCOSE BLD-MCNC: 128 MG/DL (ref 70–99)
GLUCOSE BLD-MCNC: 177 MG/DL (ref 70–99)
HAV IGM SER QL: 1.6 MG/DL (ref 1.6–2.6)
OSMOLALITY SERPL CALC.SUM OF ELEC: 298 MOSM/KG (ref 275–295)
PHOSPHATE SERPL-MCNC: 2.9 MG/DL (ref 2.5–4.9)
POTASSIUM SERPL-SCNC: 3.4 MMOL/L (ref 3.5–5.1)
SODIUM SERPL-SCNC: 142 MMOL/L (ref 136–145)

## 2021-02-24 PROCEDURE — 97535 SELF CARE MNGMENT TRAINING: CPT

## 2021-02-24 PROCEDURE — 97110 THERAPEUTIC EXERCISES: CPT

## 2021-02-24 PROCEDURE — 82962 GLUCOSE BLOOD TEST: CPT

## 2021-02-24 PROCEDURE — 97116 GAIT TRAINING THERAPY: CPT

## 2021-02-24 PROCEDURE — 97530 THERAPEUTIC ACTIVITIES: CPT

## 2021-02-24 PROCEDURE — 80069 RENAL FUNCTION PANEL: CPT | Performed by: INTERNAL MEDICINE

## 2021-02-24 PROCEDURE — 83735 ASSAY OF MAGNESIUM: CPT | Performed by: INTERNAL MEDICINE

## 2021-02-24 RX ORDER — LOPERAMIDE HYDROCHLORIDE 2 MG/1
2 CAPSULE ORAL 4 TIMES DAILY PRN
Status: DISCONTINUED | OUTPATIENT
Start: 2021-02-24 | End: 2021-02-27

## 2021-02-24 RX ORDER — POTASSIUM CHLORIDE 20 MEQ/1
40 TABLET, EXTENDED RELEASE ORAL ONCE
Status: COMPLETED | OUTPATIENT
Start: 2021-02-24 | End: 2021-02-24

## 2021-02-24 NOTE — PROGRESS NOTES
[facility]    Nephrology Progress Note    Lonnie Mcgrath Attending:  Jalen Gill, *       SUBJECTIVE:     Breathing ok, no leg swelling  Has been urinating well    PHYSICAL EXAM:     Vital Signs: /64 (BP Location: Right arm)   Puls 02/22/2021    MCHC 32.6 02/22/2021    RDW 15.5 (H) 02/22/2021    NEPRELIM 3.70 02/19/2021    NEUTABS 6.22 11/06/2013    LYMPHABS 4.83 (H) 11/06/2013    EOSABS 0.00 11/06/2013    BASABS 0.00 11/06/2013    NEUT 48 11/06/2013    LYMPH 38 (H) 11/06/2013    MON Vitamin B-12 (VITAMIN B12) tab 1,000 mcg, 1,000 mcg, Oral, Daily    •  ferrous sulfate EC tab 325 mg, 325 mg, Oral, Daily    •  folic acid (FOLVITE) tab 800 mcg, 800 mcg, Oral, Daily    •  Hydroxychloroquine Sulfate (PLAQUENIL) tab 200 mg, 200 mg, [place] [id number]    2/24/2021  12:31 PM

## 2021-02-24 NOTE — PHYSICAL THERAPY NOTE
PHYSICAL THERAPY TREATMENT NOTE - INPATIENT    Room Number: 163/478-U     Session: 4  Number of Visits to Meet Established Goals: 6    Presenting Problem: weakness, AMS      History related to current admission: Pt is a 76 y.o. female admitted 2/14/21 wit ULTRASOUND GUIDED FINE NEEDLE ASPIRATION OF  LYMPH NODE Left 5/29/2013    Performed by Irlanda Yadav MD at [address]  \"I am much better at walking backwards\"     Patient’s self-stated goal is to gain strength and be a prevention. Encouraged pt to have family member bring in AFO's for therapy. Pt states she will have them for rehab. Sup>sit min A. Pt demos fair - seated balance, c supervision. Sit>stand min A to rollator.  Pt gait trained to BR c rollator, min to mod A Good  Frequency (Obs): 3-5x/week    CURRENT GOALS     Goal #1 Patient is able to demonstrate supine - sit EOB @ level: supervision-Met  Upgrade to Mod I with HOB flat      Goal #2 Patient is able to demonstrate transfers EOB to/from [facility] at assistance level

## 2021-02-24 NOTE — PLAN OF CARE
Patient received alert and oriented, able to make needs known throughout the night. No complaints of pain, vitals remained within normal limits. Rested uneventfully this shift.      Problem: METABOLIC/FLUID AND ELECTROLYTES - ADULT  Goal: Electrolytes m

## 2021-02-24 NOTE — PROGRESS NOTES
[facility] Hospitalist Progress Note                                                                   [address] [place]  3/17/1945    c c : follow up  SUBJECTIVE:   Sitting up in bed, feels ok Continuous Infusions:     PRN: Loperamide HCl, Heparin Lock Flush, ipratropium-albuterol, acetaminophen, ondansetron HCl, Metoclopramide HCl       Assessment/Plan:  Principal Problem:    Hypercalcemia  Active Problems:    Confusion    Urinary frequency

## 2021-02-24 NOTE — PROGRESS NOTES
[facility]  Progress Note    Sonia Calzada Patient Status:  Inpatient    3/17/1945 MRN [id number]   [facility] 4NW-A Attending Kylee Melvin MD   Hosp Day # 10 PCP Oniel Peña MD     Subjective:    Sleeping     Objective: 32.0 mmol/L    Anion Gap 7 0 - 18 mmol/L    BUN 19 (H) 7 - 18 mg/dL    Creatinine 2.05 (H) 0.55 - 1.02 mg/dL    BUN/CREA Ratio 9.3 (L) 10.0 - 20.0    Calcium, Total 9.9 8.5 - 10.1 mg/dL    Calculated Osmolality 298 (H) 275 - 295 mOsm/kg    GFR, Non-African ca was 11.7   Need IVF and Pamidronate again  I will start her on steroids   Calcium is 9+     3. SUBHASH  Creatinine was 2 at the time of admission. Now with hydration it has improved but now 2.0agian  Renal on board.      4. PNA/pleural effusions  Antibiotics

## 2021-02-24 NOTE — PLAN OF CARE
Patient alert, oriented x 4, vital signs stable. Patient had 1 loose stool, Imodium given. Patient encouraged to increase oral intake.

## 2021-02-25 LAB
ALBUMIN SERPL-MCNC: 2.4 G/DL (ref 3.4–5)
ANION GAP SERPL CALC-SCNC: 6 MMOL/L (ref 0–18)
BUN BLD-MCNC: 22 MG/DL (ref 7–18)
BUN/CREAT SERPL: 10.7 (ref 10–20)
CALCIUM BLD-MCNC: 10.2 MG/DL (ref 8.5–10.1)
CHLORIDE SERPL-SCNC: 113 MMOL/L (ref 98–112)
CO2 SERPL-SCNC: 24 MMOL/L (ref 21–32)
CREAT BLD-MCNC: 2.06 MG/DL
GLUCOSE BLD-MCNC: 96 MG/DL (ref 70–99)
HAV IGM SER QL: 1.5 MG/DL (ref 1.6–2.6)
OSMOLALITY SERPL CALC.SUM OF ELEC: 299 MOSM/KG (ref 275–295)
PHOSPHATE SERPL-MCNC: 3.1 MG/DL (ref 2.5–4.9)
POTASSIUM SERPL-SCNC: 3.8 MMOL/L (ref 3.5–5.1)
SODIUM SERPL-SCNC: 143 MMOL/L (ref 136–145)

## 2021-02-25 PROCEDURE — 97535 SELF CARE MNGMENT TRAINING: CPT

## 2021-02-25 PROCEDURE — 80069 RENAL FUNCTION PANEL: CPT | Performed by: INTERNAL MEDICINE

## 2021-02-25 PROCEDURE — 97110 THERAPEUTIC EXERCISES: CPT

## 2021-02-25 PROCEDURE — 97530 THERAPEUTIC ACTIVITIES: CPT

## 2021-02-25 PROCEDURE — 83735 ASSAY OF MAGNESIUM: CPT | Performed by: INTERNAL MEDICINE

## 2021-02-25 RX ORDER — GABAPENTIN 100 MG/1
700 CAPSULE ORAL DAILY
Qty: 210 CAPSULE | Refills: 0 | Status: SHIPPED | OUTPATIENT
Start: 2021-02-26 | End: 2021-02-26

## 2021-02-25 RX ORDER — LOPERAMIDE HYDROCHLORIDE 2 MG/1
2 CAPSULE ORAL 4 TIMES DAILY PRN
Qty: 30 CAPSULE | Refills: 0 | Status: SHIPPED | OUTPATIENT
Start: 2021-02-25

## 2021-02-25 RX ORDER — MAGNESIUM SULFATE HEPTAHYDRATE 40 MG/ML
2 INJECTION, SOLUTION INTRAVENOUS ONCE
Status: COMPLETED | OUTPATIENT
Start: 2021-02-25 | End: 2021-02-25

## 2021-02-25 NOTE — PROGRESS NOTES
[facility]    Nephrology Progress Note    Bill Kim Attending:  Trever Hi, *       SUBJECTIVE:     Diarrhea responded to immodium  No urinary complaints  Breathing ok    PHYSICAL EXAM:     Vital Signs: /55 (BP Location: Righ 31.9 02/22/2021    MCHC 32.6 02/22/2021    RDW 15.5 (H) 02/22/2021    NEPRELIM 3.70 02/19/2021    NEUTABS 6.22 11/06/2013    LYMPHABS 4.83 (H) 11/06/2013    EOSABS 0.00 11/06/2013    BASABS 0.00 11/06/2013    NEUT 48 11/06/2013    LYMPH 38 (H) 11/06/2013 200 mg, Oral, Daily    •  aspirin EC tab 81 mg, 81 mg, Oral, Nightly    •  Vitamin B-12 (VITAMIN B12) tab 1,000 mcg, 1,000 mcg, Oral, Daily    •  ferrous sulfate EC tab 325 mg, 325 mg, Oral, Daily    •  folic acid (FOLVITE) tab 800 mcg, 800 mcg, Oral, Gregory thoracentesis to pulmonary     Ok to discharge from nephrology standpoint.   Can follow up with me as an outpatient in 1 month  Can repeat RFP 1 week - I will order for monitoring kidney function    Thank you for allowing me to participate in the care of th

## 2021-02-25 NOTE — PROGRESS NOTES
[facility] Hospitalist Progress Note                                                                   [address] [facility]  3/17/1945    c c : follow up  SUBJECTIVE:   Sitting up in bed, pct at  Units Subcutaneous Q8H [address]     Continuous Infusions:     PRN: Loperamide HCl, Heparin Lock Flush, ipratropium-albuterol, acetaminophen, ondansetron HCl, Metoclopramide HCl       Assessment/Plan:  Principal Problem:    Hypercalcemia  Active Problems:    Con

## 2021-02-25 NOTE — PROGRESS NOTES
[facility]  Progress Note    Rakel Fahad Patient Status:  Inpatient    3/17/1945 MRN [id number]   [facility] 4NW-A Attending Chanel Bowen MD   [facility] Day # 6 PCP Maria Esther Earl MD     Subjective:    She feels ok      Macie Flores Oral Daily with breakfast   • gabapentin  700 mg Oral Daily   • piperacillin-tazobactam  3.375 g Intravenous Q8H   • allopurinol  200 mg Oral Daily   • aspirin  81 mg Oral Nightly   • Vitamin B-12  1,000 mcg Oral Daily   • ferrous sulfate  325 mg Oral Gregory

## 2021-02-25 NOTE — PLAN OF CARE
Alert and oriented x 4. VSS. Afebrile. No c/o pain or SOB. Had a soft BM overnight. Meds taken with applesauce. IV zosyn. Good appetite. Resting comfortably. Will monitor.

## 2021-02-25 NOTE — OCCUPATIONAL THERAPY NOTE
OCCUPATIONAL THERAPY TREATMENT NOTE - INPATIENT     Room Number: 701/941-J  Session: 2   Number of Visits to Meet Established Goals: 5    Presenting Problem: AMS, SOB    History related to current admission: Pt is a 76 y.o. female admitted 2/14/21 with kolby ULTRASOUND GUIDED FINE NEEDLE ASPIRATION OF  LYMPH NODE Left 5/29/2013    Performed by Suleman Warner MD at [address] stated, \"I need to use the bathroom. \"    Patient self-stated goal is to go to rehab tomorrow. \" following impairments: endurance, pacing, use of adaptive techniques, balance, safety awareness, strength. These deficits continue manifest functionally while performing ADLs, IADLS, home safety.    The patient is below baseline and would benefit from skil

## 2021-02-25 NOTE — PLAN OF CARE
Pt AOx4. VSS. Appetite improving. Working with OT this afternoon. Continued to be off IVF. Discharge planning--no bed available at [facility] per SW, hopeful to discharge tomorrow.

## 2021-02-26 LAB
ALBUMIN SERPL-MCNC: 2.4 G/DL (ref 3.4–5)
ANION GAP SERPL CALC-SCNC: 7 MMOL/L (ref 0–18)
BUN BLD-MCNC: 25 MG/DL (ref 7–18)
BUN/CREAT SERPL: 13.5 (ref 10–20)
CALCIUM BLD-MCNC: 10.2 MG/DL (ref 8.5–10.1)
CHLORIDE SERPL-SCNC: 111 MMOL/L (ref 98–112)
CO2 SERPL-SCNC: 25 MMOL/L (ref 21–32)
CREAT BLD-MCNC: 1.85 MG/DL
GLUCOSE BLD-MCNC: 90 MG/DL (ref 70–99)
HAV IGM SER QL: 1.9 MG/DL (ref 1.6–2.6)
OSMOLALITY SERPL CALC.SUM OF ELEC: 300 MOSM/KG (ref 275–295)
PHOSPHATE SERPL-MCNC: 3.2 MG/DL (ref 2.5–4.9)
POTASSIUM SERPL-SCNC: 3.6 MMOL/L (ref 3.5–5.1)
SARS-COV-2 RNA RESP QL NAA+PROBE: NOT DETECTED
SODIUM SERPL-SCNC: 143 MMOL/L (ref 136–145)

## 2021-02-26 PROCEDURE — 83735 ASSAY OF MAGNESIUM: CPT | Performed by: INTERNAL MEDICINE

## 2021-02-26 PROCEDURE — 80069 RENAL FUNCTION PANEL: CPT | Performed by: INTERNAL MEDICINE

## 2021-02-26 RX ORDER — PREDNISONE 20 MG/1
60 TABLET ORAL
Qty: 1 TABLET | Refills: 0 | Status: SHIPPED | OUTPATIENT
Start: 2021-02-27 | End: 2021-02-27

## 2021-02-26 RX ORDER — POTASSIUM CHLORIDE 20 MEQ/1
40 TABLET, EXTENDED RELEASE ORAL EVERY 4 HOURS
Status: COMPLETED | OUTPATIENT
Start: 2021-02-26 | End: 2021-02-27

## 2021-02-26 RX ORDER — GABAPENTIN 600 MG/1
TABLET ORAL 2 TIMES DAILY
Status: DISCONTINUED | OUTPATIENT
Start: 2021-02-26 | End: 2021-02-26 | Stop reason: SDUPTHER

## 2021-02-26 RX ORDER — GABAPENTIN 100 MG/1
200 CAPSULE ORAL ONCE
Status: COMPLETED | OUTPATIENT
Start: 2021-02-26 | End: 2021-02-26

## 2021-02-26 RX ORDER — GABAPENTIN 300 MG/1
300 CAPSULE ORAL 2 TIMES DAILY PRN
Status: DISCONTINUED | OUTPATIENT
Start: 2021-02-26 | End: 2021-02-27

## 2021-02-26 RX ORDER — GABAPENTIN 600 MG/1
TABLET ORAL 2 TIMES DAILY
Refills: 0 | Status: SHIPPED | COMMUNITY
Start: 2021-02-26

## 2021-02-26 RX ORDER — GABAPENTIN 600 MG/1
600 TABLET ORAL
Status: DISCONTINUED | OUTPATIENT
Start: 2021-02-26 | End: 2021-02-27

## 2021-02-26 NOTE — OCCUPATIONAL THERAPY NOTE
OCCUPATIONAL THERAPY TREATMENT NOTE - INPATIENT     Room Number: 797/826-T  Session: 2  Number of Visits to Meet Established Goals: 5    Presenting Problem: AMS, SOB    History related to current admission: Pt is a 76 y.o. female admitted 2/14/21 with weak ULTRASOUND GUIDED FINE NEEDLE ASPIRATION OF  LYMPH NODE Left 5/29/2013    Performed by Percy Gallardo MD at [address]  \"I would like to walk into the bathroom. \"     Patient self-stated goal is to go into the bathroom with questions answered, needs met and call light within reach. Pt's RN/PCT was made aware of pt's present position and condition. Patient End of Session: Up in chair;Call light within reach; Needs met;Bracing education provided; All patient questions and techniques;ADL training;IADL training;Continued evaluation; Compensatory technique education;Equipment eval/education;Patient/Family training;Patient/Family education; Endurance training;UE strengthening/ROM; Functional transfer training  Rehab Potential : Go

## 2021-02-26 NOTE — CM/SW NOTE
Spoke w/Radha from [facility]. They will have a bed for the pt tomorrow. Pt going to Room 3314, [phone]. Sid Shi would like to have the pt leave at 1pm tomorrow.

## 2021-02-26 NOTE — PROGRESS NOTES
[facility]  Progress Note    Jerilyn Rickymadi Patient Status:  Inpatient    3/17/1945 MRN [id number]   [facility] 4NW-A Attending Stella John MD   [facility] Day # 15 PCP Gatito Lowe MD     Subjective:    She feels ok      Susanne Andrade Daily   • Hydroxychloroquine Sulfate  200 mg Oral BID   • Metoprolol Succinate ER  50 mg Oral Daily Beta Blocker   • vitamin B-6 (pyridoxine)  100 mg Oral Daily   • Vitamin B-1  250 mg Oral Daily   • Heparin Sodium (Porcine)  5,000 Units Subcutaneous Q8H S

## 2021-02-26 NOTE — PLAN OF CARE
Patient up sitting in the chair. Denies pain,just discomfort from neuropathy. Had good appetite w/ breakfast. Took all meds w/ difficulty. Patient states she isn't getting enough dose of gabapentin.  Informed Dr Rafat Gamble will take of it she told RN

## 2021-02-26 NOTE — PROGRESS NOTES
[facility] Hospitalist Progress Note                                                                   [address] [place]  3/17/1945    c c : follow up  SUBJECTIVE:   Sitting up in bed, says hav (pyridoxine)  100 mg Oral Daily   • Vitamin B-1  250 mg Oral Daily   • Heparin Sodium (Porcine)  5,000 Units Subcutaneous Q8H [address]     Continuous Infusions:     PRN: Loperamide HCl, Heparin Lock Flush, ipratropium-albuterol, acetaminophen, ondansetron HCl,

## 2021-02-26 NOTE — PLAN OF CARE
Patient received alert and oriented, able to make needs known.  No complaints of pain this shift, she rested uneventfully throughout the night       Problem: METABOLIC/FLUID AND ELECTROLYTES - ADULT  Goal: Electrolytes maintained within normal limits  D

## 2021-02-27 VITALS
HEIGHT: 66 IN | HEART RATE: 83 BPM | DIASTOLIC BLOOD PRESSURE: 66 MMHG | TEMPERATURE: 98 F | RESPIRATION RATE: 14 BRPM | SYSTOLIC BLOOD PRESSURE: 153 MMHG | BODY MASS INDEX: 21.23 KG/M2 | OXYGEN SATURATION: 94 % | WEIGHT: 132.13 LBS

## 2021-02-27 LAB
ALBUMIN SERPL-MCNC: 2.4 G/DL (ref 3.4–5)
ANION GAP SERPL CALC-SCNC: 7 MMOL/L (ref 0–18)
BUN BLD-MCNC: 26 MG/DL (ref 7–18)
BUN/CREAT SERPL: 14.7 (ref 10–20)
CALCIUM BLD-MCNC: 10.1 MG/DL (ref 8.5–10.1)
CHLORIDE SERPL-SCNC: 111 MMOL/L (ref 98–112)
CO2 SERPL-SCNC: 25 MMOL/L (ref 21–32)
CREAT BLD-MCNC: 1.77 MG/DL
GLUCOSE BLD-MCNC: 89 MG/DL (ref 70–99)
HAV IGM SER QL: 1.7 MG/DL (ref 1.6–2.6)
OSMOLALITY SERPL CALC.SUM OF ELEC: 300 MOSM/KG (ref 275–295)
PHOSPHATE SERPL-MCNC: 3.8 MG/DL (ref 2.5–4.9)
POTASSIUM SERPL-SCNC: 4.3 MMOL/L (ref 3.5–5.1)
SODIUM SERPL-SCNC: 143 MMOL/L (ref 136–145)

## 2021-02-27 PROCEDURE — 80069 RENAL FUNCTION PANEL: CPT | Performed by: INTERNAL MEDICINE

## 2021-02-27 PROCEDURE — 83735 ASSAY OF MAGNESIUM: CPT | Performed by: INTERNAL MEDICINE

## 2021-02-27 NOTE — DISCHARGE SUMMARY
General Medicine Discharge Summary     Patient ID:  Sonia Calzada  76year old  [id number]  3/17/1945    Admit date: 2/14/2021    Discharge date and time: 2/27/21    Attending Physician: Janine Weathers, *     Primary Care Physician: Maribell Eckert to monitor      7. R lobar pneumonia  - completed zosyn  - procal elevated at 0.79     8. R pleural effusion   - due to lymphoma vs third spacing vs infection   - pulm c/s appreciated  - received 20 mg IV lasix 2/20 with good response        Consults: IP CO information is transmitted to the ACR ([facility]) NRDR ([address]) which  includes the Dose Index Registry.   PATIENT STATED HISTORY:(As transcribed by Technologist)  Patient has lymphoma with SOB   CONTRAST USED: measures 2.2 x 1.9 cm on image 207 of series 2, previously 2.1 x 2.0 cm. 1.8 x 1.6 cm aortocaval lymph node on image 210 previously measured 2.0 x 1.6 cm. BOWEL/MESENTERY:  Uncomplicated diverticula of sigmoid and descending colon.   No bowel distention or in the SVC. Tortuous thoracic aorta. Borderline heart size with prominence of the central pulmonary vascularity. Mild increase in size of small right pleural effusion with worsening atelectasis/consolidation in the lower right lung.   Stable small left p alive    Patient Instructions: Current and discharge medications reviewed with patient  Current Discharge Medication List    START taking these medications    gabapentin 600 MG Oral Tab  Take 1-1.5 tablets (600-900 mg total) by mouth 2 (two) times a day. appointment, unless clinically required.  Ophthalmology - [address], [place]  [address] [place] [phone]       Per nephrology: Please increase your hydration at home  Check a blood test for electrolytes and kidney fun

## 2021-02-27 NOTE — CM/SW NOTE
Pt will dc to  [facility] today at 1300 via [facility] ambulance. Please call report to # [phone]. Patient will be admitted to room #5644 @ .      informed Jr Kong via phone call of the above.      Hardeep Garcia, RN, BSN   [phone]

## 2021-02-27 NOTE — PLAN OF CARE
Pt alert and oriented x4. VSS and afebrile. No acute events overnight. Pt denies pain. Pt ready for DC to [facility] at 1pm.  Pt now on original dose of neurontin, Pt now content. No acute events overnight. All needs attended to.   Fall precautions maintained

## 2021-02-27 NOTE — PROGRESS NOTES
[facility]    Nephrology Progress Note    Pravin Correa Attending:  Randa Núñez, *       SUBJECTIVE:     DC planning  No urinary complaints  Breathing ok  No tremor, appetite stable    PHYSICAL EXAM:     Vital Signs: /62 (BP Locat MCH 31.9 02/22/2021    MCHC 32.6 02/22/2021    RDW 15.5 (H) 02/22/2021    NEPRELIM 3.70 02/19/2021    NEUTABS 6.22 11/06/2013    LYMPHABS 4.83 (H) 11/06/2013    EOSABS 0.00 11/06/2013    BASABS 0.00 11/06/2013    NEUT 48 11/06/2013    LYMPH 38 (H) 11/06/20 mg, 325 mg, Oral, Daily    •  folic acid (FOLVITE) tab 800 mcg, 800 mcg, Oral, Daily    •  Hydroxychloroquine Sulfate (PLAQUENIL) tab 200 mg, 200 mg, Oral, BID    •  Metoprolol Succinate ER (Toprol XL) 24 hr tab 50 mg, 50 mg, Oral, Daily Beta Blocker    • to discharge from nephrology standpoint.   Can follow up with me as an outpatient in 1 month  Can repeat RFP 1 week    [address] Nephrology

## 2021-02-27 NOTE — PLAN OF CARE
Discharge nurse updated writer informing that there still has no available bed for patient Patient made aware & she understands the situation.

## 2021-02-28 LAB — PTH RELATED PEPTIDE: 2.9 PMOL/L

## 2021-03-13 ENCOUNTER — HOSPITAL ENCOUNTER (EMERGENCY)
Facility: HOSPITAL | Age: 76
Discharge: STILL ADMITTED OR ADMINISTRATIVE | End: 2021-03-13
Payer: COMMERCIAL

## 2021-04-17 ENCOUNTER — NURSE ONLY (OUTPATIENT)
Dept: LAB | Age: 76
End: 2021-04-17
Attending: FAMILY MEDICINE
Payer: MEDICARE

## 2021-04-17 DIAGNOSIS — R65.21 SPONTANEOUS ABORTION WITH SEPTIC SHOCK (HCC): Primary | ICD-10-CM

## 2021-04-17 DIAGNOSIS — O03.87 SPONTANEOUS ABORTION WITH SEPTIC SHOCK (HCC): Primary | ICD-10-CM

## 2021-04-17 DIAGNOSIS — Z11.59 SCREENING EXAMINATION FOR POLIOMYELITIS: Primary | ICD-10-CM

## 2021-04-17 PROCEDURE — 82306 VITAMIN D 25 HYDROXY: CPT

## 2021-04-17 PROCEDURE — 85007 BL SMEAR W/DIFF WBC COUNT: CPT

## 2021-04-17 PROCEDURE — 85027 COMPLETE CBC AUTOMATED: CPT

## 2021-04-17 PROCEDURE — 36415 COLL VENOUS BLD VENIPUNCTURE: CPT

## 2021-04-17 PROCEDURE — 85025 COMPLETE CBC W/AUTO DIFF WBC: CPT

## 2021-04-17 PROCEDURE — 80053 COMPREHEN METABOLIC PANEL: CPT

## 2021-04-17 PROCEDURE — 83735 ASSAY OF MAGNESIUM: CPT

## 2021-04-22 ENCOUNTER — NURSE ONLY (OUTPATIENT)
Dept: LAB | Age: 76
End: 2021-04-22
Attending: FAMILY MEDICINE
Payer: MEDICARE

## 2021-04-22 DIAGNOSIS — D63.8 MEGALOBLASTIC ANEMIA DUE TO FISH TAPEWORM: Primary | ICD-10-CM

## 2021-04-22 DIAGNOSIS — B70.0 MEGALOBLASTIC ANEMIA DUE TO FISH TAPEWORM: Primary | ICD-10-CM

## 2021-04-22 PROCEDURE — 85025 COMPLETE CBC W/AUTO DIFF WBC: CPT

## 2021-04-22 PROCEDURE — 82306 VITAMIN D 25 HYDROXY: CPT

## 2021-04-22 PROCEDURE — 83735 ASSAY OF MAGNESIUM: CPT

## 2021-04-22 PROCEDURE — 85027 COMPLETE CBC AUTOMATED: CPT

## 2021-04-22 PROCEDURE — 80053 COMPREHEN METABOLIC PANEL: CPT

## 2021-04-22 PROCEDURE — 85007 BL SMEAR W/DIFF WBC COUNT: CPT

## 2021-04-29 ENCOUNTER — NURSE ONLY (OUTPATIENT)
Dept: LAB | Age: 76
End: 2021-04-29
Attending: FAMILY MEDICINE
Payer: MEDICARE

## 2021-04-29 DIAGNOSIS — M62.81 MUSCLE WEAKNESS (GENERALIZED): Primary | ICD-10-CM

## 2021-04-29 PROCEDURE — 85007 BL SMEAR W/DIFF WBC COUNT: CPT

## 2021-04-29 PROCEDURE — 82306 VITAMIN D 25 HYDROXY: CPT

## 2021-04-29 PROCEDURE — 85025 COMPLETE CBC W/AUTO DIFF WBC: CPT

## 2021-04-29 PROCEDURE — 83735 ASSAY OF MAGNESIUM: CPT

## 2021-04-29 PROCEDURE — 85027 COMPLETE CBC AUTOMATED: CPT

## 2021-04-29 PROCEDURE — 80053 COMPREHEN METABOLIC PANEL: CPT

## 2024-08-26 NOTE — PROGRESS NOTES
Addended by: EAMON DIETZ on: 8/26/2024 10:08 AM     Modules accepted: Orders     [facility]    Nephrology Progress Note    [facility] Attending:  Penny Carpenter, *       SUBJECTIVE:     DC planning  No urinary complaints  Breathing ok    PHYSICAL EXAM:     Vital Signs: /68 (BP Location: Right arm)   Pulse 67 MCHC 32.6 02/22/2021    RDW 15.5 (H) 02/22/2021    NEPRELIM 3.70 02/19/2021    NEUTABS 6.22 11/06/2013    LYMPHABS 4.83 (H) 11/06/2013    EOSABS 0.00 11/06/2013    BASABS 0.00 11/06/2013    NEUT 48 11/06/2013    LYMPH 38 (H) 11/06/2013    MON 13 (H) 11/06/ Daily    •  folic acid (FOLVITE) tab 800 mcg, 800 mcg, Oral, Daily    •  Hydroxychloroquine Sulfate (PLAQUENIL) tab 200 mg, 200 mg, Oral, BID    •  Metoprolol Succinate ER (Toprol XL) 24 hr tab 50 mg, 50 mg, Oral, Daily Beta Blocker    •  vitamin B-6 Sacha Geller outpatient in 1 month  Can repeat RFP 1 week    [facility]phrology

## (undated) NOTE — IP AVS SNAPSHOT
[address]            (For Outpatient Use Only) Initial Admit Date: 2/14/2021   Inpt/Obs Admit Date: Inpt: 2/14/21 / Obs: N/A   Discharge Date:    Marija Scales:  [de-identified]   MRN: [de-identified]   CSN: [id number]   CEID: [id number] Payor:  Plan:    Group Number:  Insurance Type:    Subscriber Name:  Subscriber :    Subscriber ID:  Pt Rel to Subscriber:    Hospital Account Financial Class: Medicare    2021

## (undated) NOTE — IP AVS SNAPSHOT
Patient Demographics     Address  [address] [place] [address] Phone  [phone] [facility])  [phone] (Mobile) *Preferred* E-mail Address  [email or web address]      Emergency Contact(s)     Name Relation Home Work Mobile    Negra Cristy Gill MD         Hydroxychloroquine Sulfate 200 MG Tabs  Commonly known as: PLAQUENIL      Take 200 mg by mouth 2 (two) times daily. Iron 28 MG Tabs      Take 65 mg by mouth daily.           Loperamide HCl 2 MG Caps  Commonly know [id number] Vitamin B-12 (VITAMIN B12) tab 1,000 mcg 02/27/21 0859 Given      [id number] allopurinol (ZYLOPRIM) tab 200 mg 02/27/21 0858 Given      [id number] aspirin EC tab 81 mg 02/26/21 2049 Given      [id number] ferrous sulfate EC tab 325 mg 02/27/21 0859 Ordering provider: Sherice Cline MD  02/26/21 2308 Resulting lab: [id number])    Specimen Information    Type Source Collected On   Blood — 02/27/21 0617          Components    Component Value Reference Range Flag Lab Specimen: Other from Nares      Rapid SARS-CoV-2 by PCR Not Detected    Clostridium difficile(toxigenic)PCR Once [[id number]]  (Normal) Collected: 02/22/21 5743    Order Status: Completed Lab Status: Final result Updated: 02/23/21 8499    Specimen: Stool • HYPERTENSION    • Hypertension    • Low grade B-cell lymphoma ([facility] [address] 7/29/2013   • Neuropathy     Neuropathy both feet   • Neuropathy    • Osteoarthritis    • Osteoarthrosis, unspecified whether generalized or localized, unspecified site    • OTHER DISEAS colectomy/colitis       Home meds:    •  gabapentin 400 MG Oral Cap, Take 1,200 mg by mouth every 8 (eight) hours. , Disp: , Rfl: , 2/14/2021 at 0900    •  Hydroxychloroquine Sulfate 200 MG Oral Tab, Take 200 mg by mouth 2 (two) times daily.   , Disp: , PLT 79.0 02/15/2021    CREATSERUM 1.48 02/15/2021    BUN 26 02/15/2021     02/15/2021    K 3.6 02/15/2021     02/15/2021    CO2 27.0 02/15/2021     02/15/2021    CA 11.4 02/15/2021    ALB 2.2 02/15/2021    ALKPHO 60 02/14/2021    BILT 0 Monitor    3. Hypokalemia, hypomagnesemia:   Replete per protocol     4. CKD III:   Renal on consult   BUN/Creat improved with IVFs overnight. Heparin for dvt ppx    Outpatient records and/or previous hospital records reviewed.    [facility] hospitalist to cont • [facility]2013   • Neuropathy     Neuropathy both feet   • Neuropathy    • Osteoarthritis    • Osteoarthrosis, unspecified whether generalized or localized, unspecified site    • OTHER DISEASES     Neuropathy Left foot   • Pneu •  gabapentin 400 MG Oral Cap, Take 1,200 mg by mouth every 8 (eight) hours. , Disp: , Rfl: , 2/14/2021 at 0900    •  Hydroxychloroquine Sulfate 200 MG Oral Tab, Take 200 mg by mouth 2 (two) times daily.   , Disp: , Rfl: , 2/14/2021 at 0900    •  aspirin 81 BUN 26 02/15/2021     02/15/2021    K 3.6 02/15/2021     02/15/2021    CO2 27.0 02/15/2021     02/15/2021    CA 11.4 02/15/2021    ALB 2.2 02/15/2021    ALKPHO 60 02/14/2021    BILT 0.5 02/14/2021    TP 6.1 02/14/2021    AST 27 02/14/20 Renal on consult   BUN/Creat improved with IVFs overnight. Heparin for dvt ppx    Outpatient records and/or previous hospital records reviewed.    [facility] hospitalist to continue to follow patient while in [facility] 2 MD  [facility] Hospitalist  Per Past Medical History:   Diagnosis Date   • Atrial arrhythmia 3/5/2014   • Cancer [facility])     Non Hodgkins Lymphoma   • Gout    • Gout    • HYPERTENSION    • Hypertension    • Low grade B-cell lymphoma ([facility] [address] 7/29/2013   • Neuropathy     Neuropathy both fee knee problems /oa   • Other Sister         thr/tkr   • Other Sister         neuropathy feet   • Other Son         colectomy/colitis         Home Medications:    •  gabapentin 400 MG Oral Cap, Take 1,200 mg by mouth every 8 (eight) hours. , Disp: , Rfl: •  Vitamin B-12 (VITAMIN B12) tab 1,000 mcg, 1,000 mcg, Oral, Daily  •  ferrous sulfate EC tab 325 mg, 325 mg, Oral, Daily  •  folic acid (FOLVITE) tab 800 mcg, 800 mcg, Oral, Daily  •  Hydroxychloroquine Sulfate (PLAQUENIL) tab 200 mg, 200 mg, Oral, BID 02/18/21 0517 146/60 97.6 °F (36.4 °C) Oral 72 20 96 %   02/18/21 0007 135/55 98.4 °F (36.9 °C) Oral 79 — 97 %     O2 requirement: RA    Wt Readings from Last 3 Encounters:  02/14/21 : 132 lb 1.6 oz (59.9 kg)  12/01/20 : 123 lb 9.6 oz (56.1 kg)  06/10/20 : Imaging: I have independently visualized all relevant chest imaging in PACS. I agree with the radiology interpretation except where noted. ASSESSMENT/PLAN:  1.  PNA[KW.1]:[KW.2] with associated effusion  -PCT elevated  -on zosyn  -sputum culture  -ur 0-28   Low Risk. TCM Follow-Up Recommendation:  LACE 29-58:  Moderate Risk of readmission after discharge from the hospital.          Discharge Condition: alive    Important follow up  -PCP Siobhan Ocasio MD see appointments listed below    -Labs:  as [facility] Radiology:  Xr Chest Ap/pa (1 View) (cpt=71045)    Result Date: 2/16/2021  PROCEDURE:  XR CHEST AP/PA (1 VIEW) (CPT=71045)  TECHNIQUE:  AP chest radiograph was obtained.   COMPARISON:  [facility] , XR, XR CHEST AP PORTABLE  (CPT=71045), 2/14/2021, 4:49 PM.  [facility] PROCEDURE:  CT CHEST+ABDOMEN+PELVIS(ALL CNTRST ONLY)(CPT=71260/09703)  COMPARISON:  [facility], CT CHEST+ABDOMEN+PELVIS(CPT=71250/57612), 11/05/2013, 8:46 AM.  INDICATIONS:  lymphoma  TECHNIQUE:  IV contrast-enhanced scanning through the chest, abdomen, a splenomegaly measuring up to 17.6 cm craniocaudal, previously 14.3 cm. KIDNEYS:  No mass, obstruction, or calcification. Right renal cysts. ADRENALS:  No mass or enlargement. AORTA:  Atherosclerosis. No aneurysm.  RETROPERITONEUM:  Retroperitoneal lympha PROCEDURE:  XR CHEST AP PORTABLE  (CPT=71045)  TECHNIQUE:  AP chest radiograph was obtained.   COMPARISON:  [facility] , XR, XR CHEST AP/PA (1 VIEW) (CPT=71045), 2/16/2021, 10:04 PM.  INDICATIONS:  eval right effusion  PATIENT STATED HISTORY: (As transcribed by PROCEDURE:  XR CHEST AP PORTABLE  (CPT=71045)  TECHNIQUE:  AP chest radiograph was obtained.   COMPARISON:  [facility] , XR, XR CHEST PA + LAT CHEST (CPT=71020), 9/05/2015, 2:46 PM.  INDICATIONS:  Fatigue, possible UTI  PATIENT STATED HISTORY: (As transcribed b Vitamin B-1 100 MG Oral Tab  Take 250 mg by mouth daily. Ferrous Sulfate (IRON) 28 MG Oral Tab  Take 65 mg by mouth daily. Cyanocobalamin (VITAMIN B-12) 1000 MCG Oral Tab  Take 1,000 mcg by mouth daily.     Metoprolol Succinate ER 50 MG Oral Tablet Physical Therapy Notes (last 72 hours) (Notes from 2/24/2021 12:32 PM through 2/27/2021 12:32 PM)      Physical Therapy Note signed by Leslee Riley PTA at 2/24/2021  4:01 PM  Version 1 of 1    Author: Leslee Riley PTA Service: [facility] right TKR (Dr. Victoriano Goodrich)   • LAPAROSCOPY DIAGNOSTIC (Jesika Hughes) Left 11/26/2007    Performed by Randine Pallas, MD at [address] [place] [address]   • OTHER SURGICAL HISTORY  2007    ovarian cyst removal   • OTHER SURGICAL HISTORY  2-15-10    cystocele repair   • Standardized Score (AM-PAC Scale): 39.45   CMS Modifier (G-Code): CK    FUNCTIONAL ABILITY STATUS  Gait Assessment   Gait Assistance:  Moderate assistance  Distance (ft): 10x2  Assistive Device: Rolling walker  Pattern: R Foot drop;L Foot drop;R Steppage;L The -PAC '6-Clicks' Inpatient Basic Mobility Short Form was completed and this patient is demonstrating a 57.7% degree of impairment in mobility. Research supports that patients with this level of impairment may benefit from DC to acute rehabilitation. History related to current admission: Pt is a 76 y.o. female admitted 2/14/21 with weakness and AMS. Pt presenting with hypercalcemia and likely recurrence of NHL.       PMH: gout, CKD, non-Hodgkins lymphoma, GBS    Problem List[LD.1]  Principal Problem: SUBJECTIVE  \"I would like to walk into the bathroom. \"     Patient self-stated goal is to go into the bathroom     OBJECTIVE[LD.1]  Precautions: Bed/chair alarm(B AFOs)[LD.2]    WEIGHT BEARING RESTRICTION[LD.1]  Weight Bearing Restriction: None[LD.2] Skilled Therapy Provided: Pt was found in bed in a semi-supine position. Pt performed supine>sit EOB with CG assistance. Pt performed sit>stand with RW and min assistance with min verbal/visual/tactile cues for safety with RW use and hand placement.  Pt per Recommend acute rehab as pt is highly motivated to return to her PLOF. As baseline, pt is independent with all functional mobility and ADL tasks. Pt is currently below her PLOF.  Acute rehab would be beneficial to address neuromuscular reeducation including LD.2 Painter [place] on 2/26/2021  6:44 AM               Occupational Therapy Note signed by Gunjan Eid OT at 2/25/2021  4:28 PM  Version 1 of 1    Author: Gunjan Eid OT Service: Rehab Author Type: Occupational Therapist    Filed: 2/2 • LAPAROSCOPY DIAGNOSTIC (GYNE) Left 11/26/2007    Performed by Klaudia Ortiz MD at [address] [place] [address]  2007    ovarian cyst removal   • OTHER SURGICAL HISTORY  2-15-10    cystocele repair   • OTHER SURGICAL HISTORY Skilled Therapy Provided: Pt was received supine in bed for session, pt t/f to EOB with mod assist, pt sat with CGA, pt was able to amb x1 in room with RW and with mod assist, therapist assist pt to complete t/f to commode with mod assist and complete toil Patient will be supervision with bilateral AROM HEP (home exercise program).     Additional Goals:  Pt will verbalize at least 3 energy conservation techniques  Pt will stand at sink for 5 minutes to complete grooming routine     Writer PPE: Surgical mask,